# Patient Record
Sex: FEMALE | Race: WHITE | NOT HISPANIC OR LATINO | Employment: OTHER | URBAN - METROPOLITAN AREA
[De-identification: names, ages, dates, MRNs, and addresses within clinical notes are randomized per-mention and may not be internally consistent; named-entity substitution may affect disease eponyms.]

---

## 2022-01-12 ENCOUNTER — TELEPHONE (OUTPATIENT)
Dept: OBGYN CLINIC | Facility: CLINIC | Age: 37
End: 2022-01-12

## 2022-01-12 NOTE — TELEPHONE ENCOUNTER
Naye ervin she is 8 weeks pregnant  Could not get appt until she is about 12 weeks  She has been having dizziness and would like to know if she can get bloodwork check her iron level  Return call to naye ERVIN dizziness is not uncommon in pregnancy  Recommend increasing water intake to 64 ounces per day, use caution when changing positions  Prenatal labs will be ordered at time of first prenatal visit   Call back to discuss any additional symptoms or concerns she may be having for further review

## 2022-01-31 ENCOUNTER — VBI (OUTPATIENT)
Dept: ADMINISTRATIVE | Facility: OTHER | Age: 37
End: 2022-01-31

## 2022-02-01 ENCOUNTER — TELEPHONE (OUTPATIENT)
Dept: OBGYN CLINIC | Facility: CLINIC | Age: 37
End: 2022-02-01

## 2022-02-01 NOTE — TELEPHONE ENCOUNTER
Bronson Jenkins called emergency line stating she is 11 weeks and thinks she is miscarrying  She started with bleeding and mid pelvic cramping last evening  Increased cramping, bleeding and passing clots this morning  She is currently camping in [place] with family  Inquired if she has transportation to local emergency room  She reports her  can drive her  Requested she c/b after ER evaluation with update  Advised SOG will want to follow up with her  Patient verbalized agreement and understanding

## 2022-02-07 NOTE — TELEPHONE ENCOUNTER
Patient called stating that she was in [place] for a family trip last week  She states she went to the ER in a small town and they confirmed she was having a miscarriage and was giving a medication to open up her cervix and removed the POC  She was unsure to keep her appointment tomorrow with the doctor to follow-up or not  She states she does have the records from the ER and procedure  Advised her will keep her appointment with Dr Janie Nuñez to follow-up @ 3P tomorrow for her SAB and to make sure she bring those records with her to appointment  Pt voiced appreciation  1st PN appt cancelled with nurse and changed on Dr Ish Lofton schedule for [facility] Problem visit  Please contact our clinic if you have questions or if problems arise:    [place] office: [phone]  [facility] office: [phone]    If it is an urgent matter when the clinic is closed, please contact the [facility]  [phone] and ask to have Dr. Robbie Velez, or the ENT resident on-call paged. If it is a serious matter requiring immediate attention, please call 911 or go to your nearest emergency department.

## 2022-02-08 ENCOUNTER — ROUTINE PRENATAL (OUTPATIENT)
Dept: OBGYN CLINIC | Facility: CLINIC | Age: 37
End: 2022-02-08
Payer: COMMERCIAL

## 2022-02-08 VITALS
DIASTOLIC BLOOD PRESSURE: 60 MMHG | BODY MASS INDEX: 20.55 KG/M2 | SYSTOLIC BLOOD PRESSURE: 100 MMHG | HEIGHT: 63 IN | WEIGHT: 116 LBS

## 2022-02-08 DIAGNOSIS — O03.9 SAB (SPONTANEOUS ABORTION): Primary | ICD-10-CM

## 2022-02-08 PROCEDURE — 99214 OFFICE O/P EST MOD 30 MIN: CPT | Performed by: OBSTETRICS & GYNECOLOGY

## 2022-03-09 PROBLEM — O03.9 SAB (SPONTANEOUS ABORTION): Status: ACTIVE | Noted: 2022-02-08

## 2022-03-09 NOTE — PROGRESS NOTES
Assessment/Plan:    SAB (spontaneous )  Pt had SAB while in [place] last week  She was seen in the ER where an IUP without FHT was confirmed and she passed the gestational sac  Bleeding has stopped  She did receive Rhig per ER documentation  Exam and U/S today show a thin endometrial stripe and no gestational sac  There is no blood in the vault and the cervix is not dilated  Reviewed expectations regarding return of cycles and fertility       Diagnoses and all orders for this visit:    SAB (spontaneous )    Other orders  -     Prenatal Vit-Fe Fumarate-FA (PRENATAL VITAMINS PO); Take by mouth  -     Cyanocobalamin (Vitamin B 12) 100 MCG LOZG; Take by mouth          Subjective:      Patient ID: Suraj Madrid is a 39 y o  female      HPI    The following portions of the patient's history were reviewed and updated as appropriate: allergies, current medications, past family history, past medical history, past social history, past surgical history and problem list     Review of Systems      Objective:      /60 (BP Location: Right arm, Patient Position: Sitting, Cuff Size: Standard)   Ht 5' 3" (1 6 m)   Wt 52 6 kg (116 lb)   LMP 2021   BMI 20 55 kg/m²          Physical Exam

## 2022-03-09 NOTE — ASSESSMENT & PLAN NOTE
Pt had SAB while in [place] last week  She was seen in the ER where an IUP without FHT was confirmed and she passed the gestational sac  Bleeding has stopped  She did receive Rhig per ER documentation  Exam and U/S today show a thin endometrial stripe and no gestational sac  There is no blood in the vault and the cervix is not dilated   Reviewed expectations regarding return of cycles and fertility

## 2024-07-02 ENCOUNTER — ULTRASOUND (OUTPATIENT)
Dept: OBGYN CLINIC | Facility: CLINIC | Age: 39
End: 2024-07-02
Payer: COMMERCIAL

## 2024-07-02 VITALS
WEIGHT: 126 LBS | DIASTOLIC BLOOD PRESSURE: 58 MMHG | SYSTOLIC BLOOD PRESSURE: 110 MMHG | HEIGHT: 63 IN | BODY MASS INDEX: 22.32 KG/M2

## 2024-07-02 DIAGNOSIS — N91.2 AMENORRHEA: Primary | ICD-10-CM

## 2024-07-02 PROCEDURE — 76817 TRANSVAGINAL US OBSTETRIC: CPT | Performed by: OBSTETRICS & GYNECOLOGY

## 2024-07-02 PROCEDURE — 99213 OFFICE O/P EST LOW 20 MIN: CPT | Performed by: OBSTETRICS & GYNECOLOGY

## 2024-07-02 RX ORDER — VITAMIN B COMPLEX
1 CAPSULE ORAL DAILY
COMMUNITY

## 2024-07-02 RX ORDER — MULTIVIT WITH MINERALS/LUTEIN
1000 TABLET ORAL DAILY
COMMUNITY

## 2024-07-08 ENCOUNTER — PATIENT OUTREACH (OUTPATIENT)
Dept: OBGYN CLINIC | Facility: CLINIC | Age: 39
End: 2024-07-08

## 2024-07-08 ENCOUNTER — INITIAL PRENATAL (OUTPATIENT)
Dept: OBGYN CLINIC | Facility: CLINIC | Age: 39
End: 2024-07-08

## 2024-07-08 VITALS — HEIGHT: 63 IN | BODY MASS INDEX: 22.32 KG/M2

## 2024-07-08 DIAGNOSIS — Z3A.13 13 WEEKS GESTATION OF PREGNANCY: Primary | ICD-10-CM

## 2024-07-08 DIAGNOSIS — Z34.82 PRENATAL CARE, SUBSEQUENT PREGNANCY, SECOND TRIMESTER: ICD-10-CM

## 2024-07-08 DIAGNOSIS — Z31.430 ENCOUNTER OF FEMALE FOR TESTING FOR GENETIC DISEASE CARRIER STATUS FOR PROCREATIVE MANAGEMENT: ICD-10-CM

## 2024-07-08 PROCEDURE — OBC: Performed by: NURSE PRACTITIONER

## 2024-07-08 NOTE — PATIENT INSTRUCTIONS
Congratulation!! Please review our Pregnancy Essential Guide and [facility] L&D Virtual tour from our networks website.     [facility] Pregnancy Essentials Guide  [facility] ([email or web address])     [facility] - Virtual Tour (Coolstuff)  Federico Duran,     It was so nice getting to know you today. Remember if you have an urgent or time sensitive concern, please call the practice phone number so a clinical triage team member can review your symptoms and get in touch with our on call provider if necessary. If you have general questions or need help navigating our services please REPLY to this message so it comes directly to me and I will respond between other patients. If I am out of the office for any reason, another nurse navigator may reach out to help you.     Again, Congratulations and Thank You for choosing [facility]. I look forward to helping you through this journey.   Glenis HERNDON RN

## 2024-07-08 NOTE — PROGRESS NOTES
"Routine Prenatal Visit  [facility] - [address], [address], [place] [address]      Assessment/Plan:  39 y.o.  presenting with missed menses.  Viable pregnancy 12w1d by LMP consistent with ultrasound today.  - Continue/start prenatal vitamin  - We reviewed her current medications and discussed which are safe to continue in pregnancy  - We briefly discussed options for aneuploidy screening, to be discussed further at the prenatal intake  - Schedule prenatal intake with RN and initial prenatal visit; prenatal labs will be ordered during the prenatal intake      Subjective:    CC: Missed period    Naye Duran is a 39 y.o.  who presents with missed menses.  Patient's last menstrual period was 2024 (exact date).    Patient notes that this pregnancy was planned and desired.  She was not using contraception at the time of conception. She reports she is certain of her LMP and that she has regular menses. She has has no vaginal bleeding since her LMP.    Objective:  /58 (BP Location: Right arm, Patient Position: Sitting, Cuff Size: Standard)   Ht 5' 3\" (1.6 m)   Wt 57.2 kg (126 lb)   LMP 2024 (Exact Date)   BMI 22.32 kg/m²     Physical Exam:  General: Well appearing, no distress  CV: Regular rate  Respiratory: Unlabored breathing  Abdomen: Soft, nontender  Extremities: Without edema  Mood and Affect: Appropriate    Transvaginal Pelvic Ultrasound  Guo IUP  Yolk sac: Present  Fetal Pole: Present  CRL consistent with EGA 12w5d  Cardiac activity: Present   bpm  No adnexal masses appreciated  LMP used for dating as the CRL is 4 days different from LMP over 12 weeks gestation.     "

## 2024-07-08 NOTE — PROGRESS NOTES
SW referred for initial prenatal assessment. Patient is , [id number] with an MARLIN of 24. Patient agreeable to talking with SW today by phone.    Patient reports this is an unplanned but welcomed pregnancy. She and FOB ( Keith) both drive. Patient is a SAHM, FOB works. Patient denies needing information for MA/WIC/SNAP information, parenting education, or baby supply resources today.     Patient denies current or h/o mental health, substance use, DV/IPV, CYS involvement, and legal concerns. She indicates good support from FOB, family, and friends. She enjoys reading relaxing, and being outside for stress relief.     No SW needs identified at this time, SW closing referral. Please re-refer as needed.

## 2024-07-08 NOTE — PROGRESS NOTES
OB INTAKE INTERVIEW  Patient is 39 y.o. who presents for OB intake at 13 wks  She is accompanied by herself during this encounter - THIS IS A TELEPHONE ENCOUNTER  The father of her baby (Keith Duran) is involved in the pregnancy and is 40 years old.      Last Menstrual Period: 2024  Ultrasound: Measured 12 weeks 5 days on 2024  Estimated Date of Delivery: 2025 confirmed by US    Signs/Symptoms of Pregnancy  Current pregnancy symptoms: nausea, not presently, sl urinary frequency  Constipation no  Headaches YES (not migraine-type), sometimes takes Magnesium for migraine and/or sleep  Cramping/spotting no  PICA cravings no    Diabetes-  There is no height or weight on file to calculate BMI.  If patient has 1 or more, please order early 1 hour GTT  History of GDM no  BMI >35 no  History of PCOS or current metformin use no  History of LGA/macrosomic infant (4000g/9lbs) no    If patient has 2 or more, please order early 1 hour GTT  BMI>30 no  AMA YES  First degree relative with type 2 diabetes YES  History of chronic HTN, hyperlipidemia, elevated A1C no  High risk race (, , ,  or ) no    Hypertension- if you answer yes to any of the following, please order baseline preeclampsia labs (cbc, comprehensive metabolic panel, urine protein creatinine ratio, uric acid)  History of of chronic HTN no  History of gestational HTN no  History of preeclampsia, eclampsia, or HELLP syndrome no  History of diabetes no  History of lupus, autoimmune disease, kidney disease no    Thyroid- if yes order TSH with reflex T4  History of thyroid disease no    Bleeding Disorder or Hx of DVT-patient or first degree relative with history of. Order the following if not done previously.   (Factor V, antithrombin III, prothrombin gene mutation, protein C and S Ag, lupus anticoagulant, anticardiolipin, beta-2 glycoprotein)   no    OB/GYN-  History of abnormal pap smear no        Date of last pap smear 2028  History of HPV no  History of Herpes/HSV no  History of other STI (gonorrhea, chlamydia, trich) no  History of prior  no  History of prior  YES x 2  History of  delivery prior to 36 weeks 6 days no  History of blood transfusion no  Ok for blood transfusion YES    Substance screening  History of tobacco use YES (quit )  Currently using tobacco no  Substance Use Screen Level (N/A, LOW, HIGH) N/A    MRSA Screening-   Does the pt have a hx of MRSA? no    Immunizations:  Influenza vaccine given this season NO - recommended in pregnancy  Discussed Tdap vaccine YES - patient declines  Discussed COVID Vaccine YES - no previous Covid vaccines, no hx Covid    Genetic/MFM-  Do you or your partner have a history of any of the following in yourselves or first degree relatives?  Cystic fibrosis no  Spinal muscular atrophy no  Hemoglobinopathy/Sickle Cell/Thalassemia no  Fragile X Intellectual Disability no    If yes, discuss Carrier Screening and recommend consultation with MFM/Genetic Counseling and place specific [facility] Referral for.    If no, discuss Carrier Screening being completed once in a lifetime as a standard of care lab test. Place orders for Cystic Fibrosis Gene Test (SMK971) and Spinal Muscular Atrophy DNA ([id number])      Appointment for Nuchal Translucency Ultrasound at [facility] scheduled for - patient was not seen for D&V US until 12 wk 1 day - patient will call to schedule NT & discussion re: NIPT (MFM referral placed in patient's chart)      Interview education  [facility] Pregnancy Essentials Book reviewed, discussed and attached to their AVS YES  Nurse/Family Partnership- patient may qualify referral placed NO    Prenatal lab work scripts YES  ([facility])  Extra labs ordered:  CF/SMA (patient will verify insurance coverage & decide)    Aspirin/Preeclampsia Screen    Risk Level Risk Factor Recommendation   LOW Prior Uncomplicated full-term delivery no No Aspirin  recommendation        MODERATE Nulliparity NO Recommend low-dose aspirin if     BMI>30 NO 2 or more moderate risk factors    Family History Preeclampsia (mother/sister) YES (with 1st preg with patient)     35yr old or greater YES     Black Race, Concern for SDOH/Low Socioeconomic no     IVF Pregnancy  no     Personal History Risks (low birth weight, prior adverse preg outcome, >10yr preg interval) no         HIGH History of Preeclampsia no Recommend low-dose aspirin if     Multifetal gestation no 1 or more high risk factors    Chronic HTN no     Type 1 or 2 Diabetes no     Renal Disease no     Autoimmune Disease  no      Contraindications to ASA therapy:  NSAID/ ASA allergy: no  Nasal polyps: no  Asthma with history of ASA induced bronchospasm: no  Relative contraindications:  History of GI bleed: no  Active peptic ulcer disease: no  Severe hepatic dysfunction: no    Patient should be recommended to take ASA 162mg during this pregnancy from 12-36wks to lower her risk of preeclampsia: MODERATE RISK per screening protocol - LDASA 162 mg/day recommended to take 12-36 wk - patient will decide if plans to take    The patient has a history now or in prior pregnancy notable for:  - hx C/S x 2 (2014, 2015) - thru this practice at [place] - plans repeat C/S - may consider tubal - will sign records release @ appointment 7/9/2024  - hx SAB 2/2022 (in [place] - had follow up appointment here)  - patient's blood type/Rh is O NEGATIVE - Rhogam candidate      Details that I feel the provider should be aware of: see above    - patient follows high protein, low carb diet - no dietary restrictions - reviewed dietary recommendations in pregnancy  - taking several dietary supplements - will start prenatal vitamin w/ dha  - patient interested in monitoring blood sugars x 7-10 days instead of having 1 hr glucola or FBS/2 hr post-prandial (her  & 8 yr old daughter Type1 DM    - patient is up to date for dental cleanings -  recommended q 6 months        PN1 visit scheduled. The patient was oriented to our practice, the navigator role, reviewed delivering physicians and [facility] for Delivery. All questions were answered.    Interviewed by: SHIRA Greer RN

## 2024-07-09 ENCOUNTER — INITIAL PRENATAL (OUTPATIENT)
Dept: OBGYN CLINIC | Facility: CLINIC | Age: 39
End: 2024-07-09
Payer: COMMERCIAL

## 2024-07-09 VITALS
SYSTOLIC BLOOD PRESSURE: 110 MMHG | WEIGHT: 126.8 LBS | DIASTOLIC BLOOD PRESSURE: 50 MMHG | BODY MASS INDEX: 22.47 KG/M2 | HEIGHT: 63 IN

## 2024-07-09 DIAGNOSIS — Z3A.13 13 WEEKS GESTATION OF PREGNANCY: ICD-10-CM

## 2024-07-09 DIAGNOSIS — O09.521 MULTIGRAVIDA OF ADVANCED MATERNAL AGE IN FIRST TRIMESTER: ICD-10-CM

## 2024-07-09 DIAGNOSIS — O34.211 MATERNAL CARE DUE TO LOW TRANSVERSE UTERINE SCAR FROM PREVIOUS CESAREAN DELIVERY: Primary | ICD-10-CM

## 2024-07-09 DIAGNOSIS — Z12.4 SCREENING FOR MALIGNANT NEOPLASM OF THE CERVIX: ICD-10-CM

## 2024-07-09 LAB
EXTERNAL CHLAMYDIA SCREEN: NEGATIVE
EXTERNAL GONORRHEA SCREEN: NEGATIVE
SL AMB  POCT GLUCOSE, UA: NORMAL
SL AMB POCT URINE PROTEIN: NORMAL

## 2024-07-09 PROCEDURE — 81002 URINALYSIS NONAUTO W/O SCOPE: CPT | Performed by: OBSTETRICS & GYNECOLOGY

## 2024-07-09 PROCEDURE — PNV: Performed by: OBSTETRICS & GYNECOLOGY

## 2024-07-12 ENCOUNTER — ROUTINE PRENATAL (OUTPATIENT)
Dept: PERINATAL CARE | Facility: OTHER | Age: 39
End: 2024-07-12
Payer: COMMERCIAL

## 2024-07-12 VITALS
SYSTOLIC BLOOD PRESSURE: 104 MMHG | WEIGHT: 127 LBS | HEART RATE: 94 BPM | HEIGHT: 63 IN | BODY MASS INDEX: 22.5 KG/M2 | DIASTOLIC BLOOD PRESSURE: 54 MMHG

## 2024-07-12 DIAGNOSIS — Z33.1 PREGNANT STATE, INCIDENTAL: ICD-10-CM

## 2024-07-12 DIAGNOSIS — Z36.82 ENCOUNTER FOR NUCHAL TRANSLUCENCY TESTING: ICD-10-CM

## 2024-07-12 DIAGNOSIS — Z3A.13 13 WEEKS GESTATION OF PREGNANCY: ICD-10-CM

## 2024-07-12 DIAGNOSIS — O09.521 MULTIGRAVIDA OF ADVANCED MATERNAL AGE IN FIRST TRIMESTER: Primary | ICD-10-CM

## 2024-07-12 DIAGNOSIS — Z36.9 UNSPECIFIED ANTENATAL SCREENING: ICD-10-CM

## 2024-07-12 DIAGNOSIS — O34.211 MATERNAL CARE DUE TO LOW TRANSVERSE UTERINE SCAR FROM PREVIOUS CESAREAN DELIVERY: ICD-10-CM

## 2024-07-12 LAB
C TRACH RRNA CVX QL NAA+PROBE: NEGATIVE
CYTOLOGIST CVX/VAG CYTO: NORMAL
DX ICD CODE: NORMAL
HPV GENOTYPE REFLEX: NORMAL
HPV I/H RISK 4 DNA CVX QL PROBE+SIG AMP: NEGATIVE
N GONORRHOEA RRNA CVX QL NAA+PROBE: NEGATIVE
OTHER STN SPEC: NORMAL
PATH REPORT.FINAL DX SPEC: NORMAL
SL AMB NOTE:: NORMAL
SL AMB SPECIMEN ADEQUACY: NORMAL
SL AMB TEST METHODOLOGY: NORMAL

## 2024-07-12 PROCEDURE — 76813 OB US NUCHAL MEAS 1 GEST: CPT | Performed by: OBSTETRICS & GYNECOLOGY

## 2024-07-12 PROCEDURE — 76801 OB US < 14 WKS SINGLE FETUS: CPT | Performed by: OBSTETRICS & GYNECOLOGY

## 2024-07-12 PROCEDURE — 99203 OFFICE O/P NEW LOW 30 MIN: CPT | Performed by: OBSTETRICS & GYNECOLOGY

## 2024-07-12 PROCEDURE — 36415 COLL VENOUS BLD VENIPUNCTURE: CPT | Performed by: OBSTETRICS & GYNECOLOGY

## 2024-07-12 NOTE — PROGRESS NOTES
Patient chose to have [facility] [id number] Non-Invasive Prenatal Screen [id number] PLUS w/ SCA, WITH fetal sex (per pt request).  Patient choose to be billed through insurance.     Patient given brochure and is aware [facility] will contact patient's insurance and coordinate coverage.  Provided [facility] contact information. General inquiries [phone], Cost estimates [phone] and [facility] Billing [phone]. Website [email or web address].     Blood collection tubes labeled with patient identifiers (name, medical record number, and date of birth).     Filled out [facility] order form. Patient chose to have blood drawn in our office at time of visit. NIPS was drawn from right arm with a butterfly needle by A Zak LOPEZ. .      If patient chose to have blood work drawn at a [facility] lab we requested patient notify MFM (via phone call or Toutpost message) when blood collected so office can follow up on results.       Maternal Fetal Medicine will have results in approximately 5-7 business days and will call patient or notify via Toutpost.  Patient aware viewing lab result online will reveal fetal sex if ordered.    Patient verbalized understanding of all instructions and no questions at this time.

## 2024-07-12 NOTE — PROGRESS NOTES
"Naye presents today for a genetic screening ultrasound.  This is her fourth pregnancy.  She has a history of 2 previous full-term  sections and 1 first trimester miscarriage.  She has no significant medical history otherwise.  She has no significant substance use history.  Family history is significant for diabetes and hypertension in her mother.  Naye had no issues with hypertension in her prior pregnancies.  A review of systems is otherwise negative.    We discussed the options for genetic screening, including but not limited to first trimester screening, second trimester screening, combined first and second trimester screening, noninvasive prenatal screening (NIPS) for patients at high risk and diagnostic screening through the use of CVS and amniocentesis. We discussed the risks and benefits of each approach including the sensitivities and false positive rates as well as the difference between a screening test and a diagnostic test. At the conclusion of our discussion the patient elected noninvasive prenatal screening utilizing the MaterniT 21 plus test. The patient had this bloowork drawn in the office.   The results should be available in approximately 7-10 days.    We discussed follow-up in detail and I recommend an anatomy ultrasound be scheduled for 20 weeks gestation.    Thank you very much for allowing us to participate in the care of this very nice patient. Should you have any questions, please do not hesitate to contact me.    Portions of the record may have been created with voice recognition software. Occasional wrong word or \"sound a like\" substitutions may have occurred due to the inherent limitations of voice recognition software. Read the chart carefully and recognize, using context, where substitutions have occurred.  "

## 2024-07-18 NOTE — PROGRESS NOTES
"Routine Prenatal Visit  [facility] - [address], [address], [place] [address]    Assessment/Plan:  Naye is a 39 y.o. year old  at 13w1d who presents for routine prenatal visit.     1. Maternal care due to low transverse uterine scar from previous  delivery  2. Multigravida of advanced maternal age in first trimester  3. 13 weeks gestation of pregnancy  -     POCT urine dip  4. Screening for malignant neoplasm of the cervix  -     IGP,CtNg,AptimaHPV,rfx16/18,45        Subjective:     CC: Prenatal care    Naye Duran is a 39 y.o.  female who presents for routine prenatal care at 13w1d.  Pregnancy ROS: no leakage of fluid, pelvic pain, or vaginal bleeding.  no fetal movement.    The following portions of the patient's history were reviewed and updated as appropriate: allergies, current medications, past family history, past medical history, obstetric history, gynecologic history, past social history, past surgical history and problem list.      Objective:  /50 (BP Location: Left arm, Patient Position: Sitting, Cuff Size: Standard)   Ht 5' 3\" (1.6 m)   Wt 57.5 kg (126 lb 12.8 oz)   LMP 2024 (Exact Date)   BMI 22.46 kg/m²   Pregravid Weight/BMI: 55.8 kg (123 lb) (BMI 21.79)  Current Weight: 57.5 kg (126 lb 12.8 oz)   Total Weight Gain: 1.724 kg (3 lb 12.8 oz)   Pre-[place] Vitals      Flowsheet Row Most Recent Value   Prenatal Assessment    Fetal Heart Rate 155   Fundal Height (cm) 14 cm   Prenatal Vitals    Blood Pressure 110/50   Weight - Scale 57.5 kg (126 lb 12.8 oz)   Urine Albumin/Glucose    Dilation/Effacement/Station    Cervical Dilation 0   Vaginal Drainage    Edema              General: Well appearing, no distress  Respiratory: Unlabored breathing  Cardiovascular: Regular rate.  Abdomen: Soft, gravid, nontender  Fundal Height: Appropriate for gestational age.  Extremities: Warm and well perfused.  Non tender.  "

## 2024-07-19 LAB
CFDNA.FET/CFDNA.TOTAL SFR FETUS: NORMAL %
CITATION REF LAB TEST: NORMAL
FET 13+18+21+X+Y ANEUP PLAS.CFDNA: NEGATIVE
FET CHR 21 TS PLAS.CFDNA QL: NEGATIVE
FET CHR 21 TS PLAS.CFDNA QL: NEGATIVE
FET MS X RISK WBC.DNA+CFDNA QL: NOT DETECTED
FET SEX PLAS.CFDNA DOSAGE CFDNA: NORMAL
FET TS 13 RISK PLAS.CFDNA QL: NEGATIVE
FET X + Y ANEUP RISK PLAS.CFDNA SEQ-IMP: NOT DETECTED
GA EST FROM CONCEPTION DATE: NORMAL D
GESTATIONAL AGE > 9:: YES
LAB DIRECTOR NAME PROVIDER: NORMAL
LAB DIRECTOR NAME PROVIDER: NORMAL
LABORATORY COMMENT REPORT: NORMAL
LIMITATIONS OF THE TEST: NORMAL
NEGATIVE PREDICTIVE VALUE: NORMAL
PERFORMANCE CHARACTERISTICS: NORMAL
POSITIVE PREDICTIVE VALUE: NORMAL
REF LAB TEST METHOD: NORMAL
SL AMB NOTE:: NORMAL
TEST PERFORMANCE INFO SPEC: NORMAL

## 2024-07-19 NOTE — RESULT ENCOUNTER NOTE
I have reviewed the results of the NIPS which are low risk.  Please call patient and notify her of these reassuring results if she has not viewed on MyChart. Please ensure she is notified of recommendation of MSAFP to be ordered and followed up through her primary Obstetrician's office.      Thank you, Evangelist Daily MD

## 2024-07-24 LAB
EXTERNAL ABO GROUPING: NORMAL
EXTERNAL ANTIBODY SCREEN: NORMAL
EXTERNAL HEMATOCRIT: 37 %
EXTERNAL HEMOGLOBIN: 12.3 G/DL
EXTERNAL HEPATITIS B SURFACE ANTIGEN: NEGATIVE
EXTERNAL HIV-1/2 AB-AG: NORMAL
EXTERNAL PLATELET COUNT: 325 K/ÂΜL
EXTERNAL RH FACTOR: NEGATIVE
EXTERNAL RUBELLA IGG QUANTITATION: NORMAL
EXTERNAL SYPHILIS TOTAL IGG/IGM SCREENING: NONREACTIVE

## 2024-07-27 LAB
ABO GROUP BLD: ABNORMAL
APPEARANCE UR: CLEAR
BACTERIA UR CULT: ABNORMAL
BACTERIA UR CULT: ABNORMAL
BACTERIA URNS QL MICRO: ABNORMAL
BASOPHILS # BLD AUTO: 0 X10E3/UL (ref 0–0.2)
BASOPHILS NFR BLD AUTO: 0 %
BILIRUB UR QL STRIP: NEGATIVE
BLD GP AB SCN SERPL QL: NEGATIVE
C TRACH RRNA SPEC QL NAA+PROBE: NEGATIVE
CASTS URNS QL MICRO: ABNORMAL /LPF
COLOR UR: YELLOW
EOSINOPHIL # BLD AUTO: 0.1 X10E3/UL (ref 0–0.4)
EOSINOPHIL NFR BLD AUTO: 1 %
EPI CELLS #/AREA URNS HPF: >10 /HPF (ref 0–10)
ERYTHROCYTE [DISTWIDTH] IN BLOOD BY AUTOMATED COUNT: 12.8 % (ref 11.7–15.4)
GLUCOSE UR QL: NEGATIVE
HBV SURFACE AG SERPL QL IA: NEGATIVE
HCT VFR BLD AUTO: 37 % (ref 34–46.6)
HCV AB S/CO SERPL IA: NON REACTIVE
HGB BLD-MCNC: 12.3 G/DL (ref 11.1–15.9)
HGB UR QL STRIP: NEGATIVE
HIV 1+2 AB+HIV1 P24 AG SERPL QL IA: NON REACTIVE
IMM GRANULOCYTES # BLD: 0.1 X10E3/UL (ref 0–0.1)
IMM GRANULOCYTES NFR BLD: 1 %
KETONES UR QL STRIP: NEGATIVE
LEUKOCYTE ESTERASE UR QL STRIP: ABNORMAL
LYMPHOCYTES # BLD AUTO: 1.4 X10E3/UL (ref 0.7–3.1)
LYMPHOCYTES NFR BLD AUTO: 15 %
MCH RBC QN AUTO: 30.2 PG (ref 26.6–33)
MCHC RBC AUTO-ENTMCNC: 33.2 G/DL (ref 31.5–35.7)
MCV RBC AUTO: 91 FL (ref 79–97)
MICRO URNS: ABNORMAL
MONOCYTES # BLD AUTO: 0.6 X10E3/UL (ref 0.1–0.9)
MONOCYTES NFR BLD AUTO: 6 %
N GONORRHOEA RRNA SPEC QL NAA+PROBE: NEGATIVE
NEUTROPHILS # BLD AUTO: 6.9 X10E3/UL (ref 1.4–7)
NEUTROPHILS NFR BLD AUTO: 77 %
NITRITE UR QL STRIP: POSITIVE
OTHER ANTIBIOTIC SUSC ISLT: ABNORMAL
PH UR STRIP: 7 [PH] (ref 5–7.5)
PLATELET # BLD AUTO: 325 X10E3/UL (ref 150–450)
PROT UR QL STRIP: NEGATIVE
RBC # BLD AUTO: 4.07 X10E6/UL (ref 3.77–5.28)
RBC #/AREA URNS HPF: ABNORMAL /HPF (ref 0–2)
RH BLD: NEGATIVE
RPR SER QL: NON REACTIVE
RUBV IGG SERPL IA-ACNC: 1.91 INDEX
SL AMB INTERPRETATION: NORMAL
SP GR UR: 1.02 (ref 1–1.03)
UROBILINOGEN UR STRIP-ACNC: 0.2 MG/DL (ref 0.2–1)
WBC # BLD AUTO: 9 X10E3/UL (ref 3.4–10.8)
WBC #/AREA URNS HPF: >30 /HPF (ref 0–5)

## 2024-08-06 ENCOUNTER — ROUTINE PRENATAL (OUTPATIENT)
Dept: OBGYN CLINIC | Facility: CLINIC | Age: 39
End: 2024-08-06
Payer: COMMERCIAL

## 2024-08-06 VITALS
DIASTOLIC BLOOD PRESSURE: 52 MMHG | HEIGHT: 63 IN | BODY MASS INDEX: 23.46 KG/M2 | WEIGHT: 132.4 LBS | SYSTOLIC BLOOD PRESSURE: 100 MMHG

## 2024-08-06 DIAGNOSIS — Z67.91 RH NEGATIVE STATE IN ANTEPARTUM PERIOD: ICD-10-CM

## 2024-08-06 DIAGNOSIS — Z36.1 NEED FOR MATERNAL SERUM ALPHA-PROTEIN (MSAFP) SCREENING: ICD-10-CM

## 2024-08-06 DIAGNOSIS — O34.211 MATERNAL CARE DUE TO LOW TRANSVERSE UTERINE SCAR FROM PREVIOUS CESAREAN DELIVERY: ICD-10-CM

## 2024-08-06 DIAGNOSIS — Z3A.17 17 WEEKS GESTATION OF PREGNANCY: ICD-10-CM

## 2024-08-06 DIAGNOSIS — O09.521 MULTIGRAVIDA OF ADVANCED MATERNAL AGE IN FIRST TRIMESTER: ICD-10-CM

## 2024-08-06 DIAGNOSIS — R82.71 BACTERIURIA DURING PREGNANCY: Primary | ICD-10-CM

## 2024-08-06 DIAGNOSIS — O99.891 BACTERIURIA DURING PREGNANCY: Primary | ICD-10-CM

## 2024-08-06 DIAGNOSIS — O26.899 RH NEGATIVE STATE IN ANTEPARTUM PERIOD: ICD-10-CM

## 2024-08-06 LAB
SL AMB  POCT GLUCOSE, UA: NORMAL
SL AMB POCT URINE PROTEIN: NORMAL

## 2024-08-06 PROCEDURE — 81002 URINALYSIS NONAUTO W/O SCOPE: CPT | Performed by: PHYSICIAN ASSISTANT

## 2024-08-06 PROCEDURE — PNV: Performed by: PHYSICIAN ASSISTANT

## 2024-08-06 NOTE — ASSESSMENT & PLAN NOTE
50,000-100,000 CFU of E. Coli in initial prenatal labs.   Reviewed with patient and recommended starting antibiotics. Patient is asymptomatic and reports that they did not have her do a clean catch at the lab. Would like to repeat clean catch urine prior to starting antibiotics. Aware to call if symptoms develop before clean catch results are in. Aware of s/s to got to ER with.

## 2024-08-06 NOTE — PROGRESS NOTES
Routine Prenatal Visit  [facility] - [address], [address], [place] [address]    Assessment/Plan:  Naye is a 39 y.o. year old  at 17w1d who presents for routine prenatal visit.     1. Bacteriuria during pregnancy  Assessment & Plan:  50,000-100,000 CFU of E. Coli in initial prenatal labs.   Reviewed with patient and recommended starting antibiotics. Patient is asymptomatic and reports that they did not have her do a clean catch at the lab. Would like to repeat clean catch urine prior to starting antibiotics. Aware to call if symptoms develop before clean catch results are in. Aware of s/s to got to ER with.   Orders:  -     Urine culture; Future  -     Urinalysis with microscopic; Future  -     Urine culture  -     Urinalysis with microscopic  2. 17 weeks gestation of pregnancy  Assessment & Plan:  Reviewed initial prenatal labs. Results console updated.   Reviewed AFP to evaluate for ONTD. Script given. Aware to have done between 15-18 weeks. Aware to check insurance for coverage prior to having one.   Has level II ultrasound scheduled.   Return to office for ob check in 4 weeks.   Orders:  -     POCT urine dip  3. Multigravida of advanced maternal age in first trimester  4. Maternal care due to low transverse uterine scar from previous  delivery  5. Rh negative state in antepartum period  6. Need for maternal serum alpha-protein (MSAFP) screening  -     Alpha fetoprotein, maternal; Future  -     Alpha fetoprotein, maternal      Next OB Visit 4 weeks.    Subjective:     CC: Prenatal care    Naye Duran is a 39 y.o.  female who presents for routine prenatal care at 17w1d.  Pregnancy ROS: Headaches last week, better this week. No FM, N/V, cramping, VB, LOF, edema, domestic violence, or smoking. Tolerating PNV.        The following portions of the patient's history were reviewed and updated as appropriate: allergies, current medications, past family history,  "past medical history, obstetric history, gynecologic history, past social history, past surgical history and problem list.      Objective:  /52   Ht 5' 3\" (1.6 m)   Wt 60.1 kg (132 lb 6.4 oz)   LMP 2024 (Exact Date)   BMI 23.45 kg/m²   Pregravid Weight/BMI: 55.8 kg (123 lb) (BMI 21.79)  Current Weight: 60.1 kg (132 lb 6.4 oz)   Total Weight Gain: 4.264 kg (9 lb 6.4 oz)   Pre- Vitals      Flowsheet Row Most Recent Value   Prenatal Assessment    Fetal Heart Rate 150   Fundal Height (cm) 17 cm   Prenatal Vitals    Blood Pressure 100/52   Weight - Scale 60.1 kg (132 lb 6.4 oz)   Urine Albumin/Glucose    Dilation/Effacement/Station    Vaginal Drainage    Edema    LLE Edema None   RLE Edema None   Facial Edema None             General: Well appearing, no distress  Abdomen: Soft, gravid, nontender  Fundal Height: Appropriate for gestational age.  Extremities: Warm and well perfused.  Non tender.      "

## 2024-08-06 NOTE — ASSESSMENT & PLAN NOTE
Reviewed initial prenatal labs. Results console updated.   Reviewed AFP to evaluate for ONTD. Script given. Aware to have done between 15-18 weeks. Aware to check insurance for coverage prior to having one.   Has level II ultrasound scheduled.   Return to office for ob check in 4 weeks.

## 2024-08-15 ENCOUNTER — TELEPHONE (OUTPATIENT)
Dept: OBGYN CLINIC | Facility: CLINIC | Age: 39
End: 2024-08-15

## 2024-08-15 LAB
2ND TRIMESTER 4 SCREEN SERPL-IMP: NORMAL
AFP ADJ MOM SERPL: 1.15
AFP INTERP AMN-IMP: NORMAL
AFP INTERP SERPL-IMP: NORMAL
AFP INTERP SERPL-IMP: NORMAL
AFP SERPL-MCNC: 56.1 NG/ML
AGE AT DELIVERY: 39.5 YR
GA METHOD: NORMAL
GA: 18.1 WEEKS
IDDM PATIENT QL: NO
MULTIPLE PREGNANCY: NO
NEURAL TUBE DEFECT RISK FETUS: 7603 %

## 2024-08-15 NOTE — TELEPHONE ENCOUNTER
Second trimester call-voicemail received, left a message to call back.   Overall how are you doing?     Compliant with routine OB care appointments? yes    Have you completed your 1st trimester labs? yes    If you had NIPS with MFM, do you have a order for MSAFP?   Completed 8/13/24   Can be completed 15w-22w9d, ideally 16w-18w    Have you seen MFM and do you have your detailed US scheduled? 9/9/24    Pregnancy Education-have you had a chance to review the classes offered and registered?     EPDS Score

## 2024-08-17 LAB
APPEARANCE UR: CLEAR
BACTERIA UR CULT: ABNORMAL
BACTERIA URNS QL MICRO: NORMAL
BILIRUB UR QL STRIP: NEGATIVE
CASTS URNS QL MICRO: NORMAL /LPF
COLOR UR: YELLOW
EPI CELLS #/AREA URNS HPF: NORMAL /HPF (ref 0–10)
GLUCOSE UR QL: NEGATIVE
HGB UR QL STRIP: NEGATIVE
KETONES UR QL STRIP: NEGATIVE
LEUKOCYTE ESTERASE UR QL STRIP: NEGATIVE
Lab: ABNORMAL
MICRO URNS: NORMAL
MICRO URNS: NORMAL
NITRITE UR QL STRIP: NEGATIVE
PH UR STRIP: 6.5 [PH] (ref 5–7.5)
PROT UR QL STRIP: NEGATIVE
RBC #/AREA URNS HPF: NORMAL /HPF (ref 0–2)
SL AMB ANTIMICROBIAL SUSCEPTIBILITY: ABNORMAL
SP GR UR: 1.01 (ref 1–1.03)
UROBILINOGEN UR STRIP-ACNC: 0.2 MG/DL (ref 0.2–1)
WBC #/AREA URNS HPF: NORMAL /HPF (ref 0–5)

## 2024-08-19 DIAGNOSIS — O99.891 BACTERIURIA DURING PREGNANCY: ICD-10-CM

## 2024-08-19 DIAGNOSIS — R82.71 BACTERIURIA DURING PREGNANCY: ICD-10-CM

## 2024-08-19 DIAGNOSIS — O23.42 URINARY TRACT INFECTION IN PREGNANCY IN SECOND TRIMESTER, ANTEPARTUM: Primary | ICD-10-CM

## 2024-08-19 RX ORDER — NITROFURANTOIN 25; 75 MG/1; MG/1
100 CAPSULE ORAL 2 TIMES DAILY
Qty: 14 CAPSULE | Refills: 0 | Status: SHIPPED | OUTPATIENT
Start: 2024-08-19 | End: 2024-08-26

## 2024-08-23 NOTE — TELEPHONE ENCOUNTER
Second Attempt-Second trimester call-voicemail received, left a message to call back.   Overall how are you doing?      Compliant with routine OB care appointments? yes     Have you completed your 1st trimester labs? yes     If you had NIPS with MFM, do you have a order for MSAFP?   Completed 8/13/24              Can be completed 15w-22w9d, ideally 16w-18w     Have you seen MFM and do you have your detailed US scheduled? 9/9/24     Pregnancy Education-have you had a chance to review the classes offered and registered?      EPDS Score

## 2024-09-04 ENCOUNTER — ROUTINE PRENATAL (OUTPATIENT)
Dept: OBGYN CLINIC | Facility: CLINIC | Age: 39
End: 2024-09-04
Payer: COMMERCIAL

## 2024-09-04 VITALS
DIASTOLIC BLOOD PRESSURE: 64 MMHG | HEIGHT: 63 IN | BODY MASS INDEX: 23.57 KG/M2 | SYSTOLIC BLOOD PRESSURE: 110 MMHG | WEIGHT: 133 LBS

## 2024-09-04 DIAGNOSIS — O09.521 MULTIGRAVIDA OF ADVANCED MATERNAL AGE IN FIRST TRIMESTER: ICD-10-CM

## 2024-09-04 DIAGNOSIS — Z3A.21 21 WEEKS GESTATION OF PREGNANCY: Primary | ICD-10-CM

## 2024-09-04 DIAGNOSIS — O34.211 MATERNAL CARE DUE TO LOW TRANSVERSE UTERINE SCAR FROM PREVIOUS CESAREAN DELIVERY: ICD-10-CM

## 2024-09-04 DIAGNOSIS — Z30.2 REQUEST FOR STERILIZATION: ICD-10-CM

## 2024-09-04 LAB
SL AMB  POCT GLUCOSE, UA: NORMAL
SL AMB POCT URINE PROTEIN: NORMAL

## 2024-09-04 PROCEDURE — PNV: Performed by: OBSTETRICS & GYNECOLOGY

## 2024-09-04 PROCEDURE — 81002 URINALYSIS NONAUTO W/O SCOPE: CPT | Performed by: OBSTETRICS & GYNECOLOGY

## 2024-09-04 NOTE — PROGRESS NOTES
"Routine Prenatal Visit  [facility] - [place]  [address], [address], [place] [address]    Assessment/Plan:  Naye is a 39 y.o. year old  at 21w2d who presents for routine prenatal visit.     1. 21 weeks gestation of pregnancy  -     POCT urine dip  2. Multigravida of advanced maternal age in first trimester  3. Maternal care due to low transverse uterine scar from previous  delivery  4. Request for sterilization  Comments:  Desires bilateral salpingectomy at time of repeat C/S  Assessment & Plan:  Desires bilateral salpingectomy at time of repeat C/S      Next OB Visit 4 weeks.    Subjective:     CC: Prenatal care    Naye Duran is a 39 y.o.  female who presents for routine prenatal care at 21w2d.  Pregnancy ROS: no leakage of fluid, pelvic pain, or vaginal bleeding.  normal fetal movement.    The following portions of the patient's history were reviewed and updated as appropriate: allergies, current medications, past family history, past medical history, obstetric history, gynecologic history, past social history, past surgical history and problem list.      Objective:  /64 (BP Location: Right arm, Patient Position: Sitting, Cuff Size: Standard)   Ht 5' 3\" (1.6 m)   Wt 60.3 kg (133 lb)   LMP 2024 (Exact Date)   BMI 23.56 kg/m²   Pregravid Weight/BMI: 55.8 kg (123 lb) (BMI 21.79)  Current Weight: 60.3 kg (133 lb)   Total Weight Gain: 4.536 kg (10 lb)   Pre- Vitals      Flowsheet Row Most Recent Value   Prenatal Assessment    Fetal Heart Rate 150   Fundal Height (cm) 21 cm   Movement Present   Prenatal Vitals    Blood Pressure 110/64   Weight - Scale 60.3 kg (133 lb)   Urine Albumin/Glucose    Dilation/Effacement/Station    Vaginal Drainage    Draining Fluid No   Edema              General: Well appearing, no distress  Abdomen: Soft, gravid, nontender  Extremities: Non tender.  "

## 2024-09-08 NOTE — PROGRESS NOTES
Please refer to the [facility] ultrasound report in Ob Procedures for additional information regarding today's visit

## 2024-09-09 ENCOUNTER — ROUTINE PRENATAL (OUTPATIENT)
Dept: PERINATAL CARE | Facility: OTHER | Age: 39
End: 2024-09-09
Payer: COMMERCIAL

## 2024-09-09 VITALS
DIASTOLIC BLOOD PRESSURE: 60 MMHG | BODY MASS INDEX: 23.99 KG/M2 | HEART RATE: 111 BPM | HEIGHT: 63 IN | SYSTOLIC BLOOD PRESSURE: 102 MMHG | WEIGHT: 135.4 LBS

## 2024-09-09 DIAGNOSIS — O09.522 ELDERLY MULTIGRAVIDA, SECOND TRIMESTER: Primary | ICD-10-CM

## 2024-09-09 DIAGNOSIS — Z3A.22 22 WEEKS GESTATION OF PREGNANCY: ICD-10-CM

## 2024-09-09 DIAGNOSIS — Z36.86 ENCOUNTER FOR ANTENATAL SCREENING FOR CERVICAL LENGTH: ICD-10-CM

## 2024-09-09 PROCEDURE — 76817 TRANSVAGINAL US OBSTETRIC: CPT | Performed by: OBSTETRICS & GYNECOLOGY

## 2024-09-09 PROCEDURE — 99213 OFFICE O/P EST LOW 20 MIN: CPT | Performed by: OBSTETRICS & GYNECOLOGY

## 2024-09-09 PROCEDURE — 76811 OB US DETAILED SNGL FETUS: CPT | Performed by: OBSTETRICS & GYNECOLOGY

## 2024-09-09 NOTE — LETTER
September 9, 2024     Madison Carranza DO  [address]  [address]  [facility] [place] [address]    Patient: Naye Duran   YOB: 1985   Date of Visit: 9/9/2024       Dear Dr. Carranza:    Thank you for referring Naye Duran to me for evaluation. Below are my notes for this consultation.    If you have questions, please do not hesitate to call me. I look forward to following your patient along with you.         Sincerely,        Alex Salas MD        CC: No Recipients    Alex Salas MD  9/9/2024  1:05 PM  Sign when Signing Visit  Please refer to the [facility] ultrasound report in Ob Procedures for additional information regarding today's visit

## 2024-09-19 ENCOUNTER — TELEPHONE (OUTPATIENT)
Age: 39
End: 2024-09-19

## 2024-09-19 NOTE — TELEPHONE ENCOUNTER
For OB Navigator: Patient called regarding scheduling c/s. Attempted to reach office. Please call pt back to schedule

## 2024-10-03 ENCOUNTER — ROUTINE PRENATAL (OUTPATIENT)
Dept: OBGYN CLINIC | Facility: CLINIC | Age: 39
End: 2024-10-03
Payer: COMMERCIAL

## 2024-10-03 VITALS
HEIGHT: 63 IN | BODY MASS INDEX: 24.63 KG/M2 | DIASTOLIC BLOOD PRESSURE: 70 MMHG | WEIGHT: 139 LBS | SYSTOLIC BLOOD PRESSURE: 110 MMHG

## 2024-10-03 DIAGNOSIS — O99.891 BACTERIURIA DURING PREGNANCY: ICD-10-CM

## 2024-10-03 DIAGNOSIS — Z3A.25 25 WEEKS GESTATION OF PREGNANCY: Primary | ICD-10-CM

## 2024-10-03 DIAGNOSIS — Z30.2 REQUEST FOR STERILIZATION: ICD-10-CM

## 2024-10-03 DIAGNOSIS — O09.522 MULTIGRAVIDA OF ADVANCED MATERNAL AGE IN SECOND TRIMESTER: ICD-10-CM

## 2024-10-03 DIAGNOSIS — R82.71 BACTERIURIA DURING PREGNANCY: ICD-10-CM

## 2024-10-03 DIAGNOSIS — Z36.85 SCREENING, ANTENATAL, FOR STREPTOCOCCUS B: ICD-10-CM

## 2024-10-03 DIAGNOSIS — O34.211 MATERNAL CARE DUE TO LOW TRANSVERSE UTERINE SCAR FROM PREVIOUS CESAREAN DELIVERY: ICD-10-CM

## 2024-10-03 DIAGNOSIS — Z67.91 RH NEGATIVE STATE IN ANTEPARTUM PERIOD: ICD-10-CM

## 2024-10-03 DIAGNOSIS — O26.899 RH NEGATIVE STATE IN ANTEPARTUM PERIOD: ICD-10-CM

## 2024-10-03 LAB
SL AMB  POCT GLUCOSE, UA: NORMAL
SL AMB POCT URINE PROTEIN: NORMAL

## 2024-10-03 PROCEDURE — PNV: Performed by: OBSTETRICS & GYNECOLOGY

## 2024-10-03 PROCEDURE — 81002 URINALYSIS NONAUTO W/O SCOPE: CPT | Performed by: OBSTETRICS & GYNECOLOGY

## 2024-10-03 NOTE — PROGRESS NOTES
"Routine Prenatal Visit  [facility] - [place]  [address], [address], [place] [address]    Assessment/Plan:  Naye is a 39 y.o. year old  at 25w3d who presents for routine prenatal visit.     1. Screening, , for Streptococcus B  -     Glucose, 1H PG; Future  -     ABO/Rh; Future  -     Antibody screen; Future  -     CBC; Future  -     RPR, Rfx Qn RPR/Confirm TP; Future  -     Glucose, 1H PG  -     ABO/Rh  -     Antibody screen  -     CBC  -     RPR, Rfx Qn RPR/Confirm TP  2. 25 weeks gestation of pregnancy  -     POCT urine dip  3. Multigravida of advanced maternal age in first trimester  4. Maternal care due to low transverse uterine scar from previous  delivery  5. Request for sterilization  6. Bacteriuria during pregnancy        Subjective:     CC: Prenatal care    Naye Duran is a 39 y.o.  female who presents for routine prenatal care at 25w3d.  Pregnancy ROS: no  leakage of fluid, pelvic pain, or vaginal bleeding.  +  fetal movement.    The following portions of the patient's history were reviewed and updated as appropriate: allergies, current medications, past family history, past medical history, obstetric history, gynecologic history, past social history, past surgical history and problem list.      Objective:  /70 (BP Location: Right arm, Patient Position: Sitting, Cuff Size: Standard)   Ht 5' 3\" (1.6 m)   Wt 63 kg (139 lb)   LMP 2024 (Exact Date)   BMI 24.62 kg/m²   Pregravid Weight/BMI: 55.8 kg (123 lb) (BMI 21.79)  Current Weight: 63 kg (139 lb)   Total Weight Gain: 7.258 kg (16 lb)   Pre- Vitals      Flowsheet Row Most Recent Value   Prenatal Assessment    Prenatal Vitals    Blood Pressure 110/70   Weight - Scale 63 kg (139 lb)   Urine Albumin/Glucose    Dilation/Effacement/Station    Vaginal Drainage    Edema              General: Well appearing, no distress  Respiratory: Unlabored breathing  Cardiovascular: Regular " rate.  Abdomen: Soft, gravid, nontender  Fundal Height: Appropriate for gestational age.  Extremities: Warm and well perfused.  Non tender.

## 2024-10-22 LAB
EXTERNAL HEMOGLOBIN: 11 G/DL
EXTERNAL PLATELET COUNT: 356 K/ÂΜL
EXTERNAL RH FACTOR: NEGATIVE
EXTERNAL SYPHILIS TOTAL IGG/IGM SCREENING: NORMAL

## 2024-10-23 LAB
ABO GROUP BLD: NORMAL
BLD GP AB SCN SERPL QL: NEGATIVE
ERYTHROCYTE [DISTWIDTH] IN BLOOD BY AUTOMATED COUNT: 12.4 % (ref 11.7–15.4)
HCT VFR BLD AUTO: 33.8 % (ref 34–46.6)
HGB BLD-MCNC: 11 G/DL (ref 11.1–15.9)
MCH RBC QN AUTO: 29.6 PG (ref 26.6–33)
MCHC RBC AUTO-ENTMCNC: 32.5 G/DL (ref 31.5–35.7)
MCV RBC AUTO: 91 FL (ref 79–97)
PLATELET # BLD AUTO: 356 X10E3/UL (ref 150–450)
RBC # BLD AUTO: 3.72 X10E6/UL (ref 3.77–5.28)
RH BLD: NEGATIVE
RPR SER QL: NON REACTIVE
WBC # BLD AUTO: 11.4 X10E3/UL (ref 3.4–10.8)

## 2024-10-24 ENCOUNTER — ROUTINE PRENATAL (OUTPATIENT)
Dept: OBGYN CLINIC | Facility: CLINIC | Age: 39
End: 2024-10-24
Payer: COMMERCIAL

## 2024-10-24 VITALS
DIASTOLIC BLOOD PRESSURE: 72 MMHG | WEIGHT: 141 LBS | BODY MASS INDEX: 24.98 KG/M2 | HEIGHT: 63 IN | SYSTOLIC BLOOD PRESSURE: 106 MMHG

## 2024-10-24 DIAGNOSIS — O34.211 MATERNAL CARE DUE TO LOW TRANSVERSE UTERINE SCAR FROM PREVIOUS CESAREAN DELIVERY: ICD-10-CM

## 2024-10-24 DIAGNOSIS — O09.523 MULTIGRAVIDA OF ADVANCED MATERNAL AGE IN THIRD TRIMESTER: ICD-10-CM

## 2024-10-24 DIAGNOSIS — O99.891 BACTERIURIA DURING PREGNANCY: ICD-10-CM

## 2024-10-24 DIAGNOSIS — Z29.13 NEED FOR RHOGAM DUE TO RH NEGATIVE MOTHER: ICD-10-CM

## 2024-10-24 DIAGNOSIS — R82.71 BACTERIURIA DURING PREGNANCY: ICD-10-CM

## 2024-10-24 DIAGNOSIS — Z3A.28 28 WEEKS GESTATION OF PREGNANCY: Primary | ICD-10-CM

## 2024-10-24 DIAGNOSIS — Z30.2 REQUEST FOR STERILIZATION: ICD-10-CM

## 2024-10-24 LAB
SL AMB  POCT GLUCOSE, UA: NORMAL
SL AMB POCT URINE PROTEIN: NORMAL

## 2024-10-24 PROCEDURE — 96372 THER/PROPH/DIAG INJ SC/IM: CPT | Performed by: OBSTETRICS & GYNECOLOGY

## 2024-10-24 PROCEDURE — 81002 URINALYSIS NONAUTO W/O SCOPE: CPT | Performed by: OBSTETRICS & GYNECOLOGY

## 2024-10-24 PROCEDURE — PNV: Performed by: OBSTETRICS & GYNECOLOGY

## 2024-10-24 NOTE — PATIENT INSTRUCTIONS
The Third Trimester  (28-42 weeks)  YOUR BABY   * your baby sucks its thumb now!   * your baby can hear voices and respond to touch…..so talk to him or her!!   * your baby’s brain grows and develops most in the last 2 months of pregnancy   * baby’s head and bones are soft and flexible so they can fit through the birth canal   * baby’s movements change towards the end of pregnancy because there is less room for kicking and stretching in your belly   * baby’s lungs are not fully developed and completely ready to breathe on their own until the last 3-4 weeks before your due date    YOUR BODY   * your belly is growing a lot now   * it may become more difficult to sleep well at night or to be as active as you usually are   * you may sweat more than usual   * you will become more off-balance……be careful not to fall!!   * you may develop hemorrhoids (which can be painful and make it difficult to sit down)   * the last two months of pregnancy can become very uncomfortable……with backaches, headaches, and heartburn   * you can start to have contractions…….as long as they are irregular and less than 5 per hour, this is a normal part of your body getting ready to have a baby   * your cervix may start to thin out and open up……to get ready for delivery   * you may find yourself needing to “pee” very often…….because baby is pressing on your bladder so much   * you may get out of breathe more quickly than usual      FETAL KICK COUNTS    In the third trimester (after 28 weeks gestation) you should be performing fetal kick counts every day.  Your baby should move at least 10 times in 2 hours during an active time, once a day.    Choose atime of day when your baby is most active.  Try to do this around the same time each day.  Get into a comfortable position and then write down the time your baby first moves.  Count each movement until the baby moves 10 times.  These movements include kicks, punches, nudges, flutters, or rolls.  This  can take anywhere from 5 minutes to 2 hours.  Write down the time you feel the baby's 10th movement.    If 2 hours has passed and your baby has not moved at least 10 times, you should CALL THE OFFICE RIGHT AWAY.  [phone]          PREMATURE LABOR     When to call [phone]:  * I need to call immediately if I have even a small amount of LIQUID leaking from my vagina, with or without contractions.   * I need to call if I am BLEEDING from my vagina.   * I need to call if I am feeling CRAMPING that continues after drinking 2-3 glasses of water and lying down on my side for one hour and that feels like I am having a period.   * I need to call if I feel CONTRACTIONS  more than 4 times in an hour that feels like the baby is “balling up” even after I try drinking 2-3 glasses of water and lying down on my side for an hour.   * I need to call if I notice a change in my vaginal DISCHARGE.   * I need to call if I am feeling PELVIC PRESSURE  that feels like the baby is pushing down into my vagina and lasts more than an hour.   * I need to call if I have LOW BACKACHE which is new and near my tailbone.  It may either come and go several times during an hour or stay there constantly.          PRE-ECLAMPSIA     What is it?   Pre-eclampsia is a serious disease that can occur during pregnancy related to high blood pressures.  It can happen to any woman.     Why should I care?   Women who develop pre-eclampsia have serious risks which can include seizures, stroke, organ damage, premature birth of their baby.  In the very worst cases, it can cause death of the mother and/or their baby.     What should I pay attention to?   Signs and symptoms of pre-eclampsia can include:   * Severe swelling of face or hands    * A headache that will not go away even after you have taken Tylenol   * Seeing spots or changes in eyesight    * Pain in the upper abdomen or shoulder    * New nausea and vomiting (in the second half of pregnancy)    *  Sudden weight gain    * Difficulty breathing     What should I do?   If you experience any of the above symptoms of pre-eclampsia, contact your OB provider.  Finding pre-eclampsia early is important for you and your baby.  Call us       BREASTFEEDING     BENEFITS FOR BABIES   * stronger immune systems (less allergies, eczema, asthma, and childhood cancers)   * less diarrhea and constipation or other GI diseases   * fewer colds and ear infections   * better vision and teeth (fewer cavities)   * improves IQ   * lower rates of diabetes and obesity in childhood     BENEFITS FOR MOMS   * promotes faster weight loss after delivery   * lower risk for postpartum depression   * lower risk for breast, uterine, and ovarian cancers   * lower risk for osteoporosis developing with age   * easier than formula - is always right with you, clean, and the right temperature   * less expensive than formula……it’s FREE !!!!     KEYS TO SUCCESSFUL BREASTFEEDING   * keep baby skin-to-skin until after first feeding event   * keep baby in your room with you during your hospital stay after delivery   * avoid any bottle feedings (unless medically necessary)   * limit the use of pacifiers and swaddling   * ask for help if you are having any issues……lactation consultants (who specialize in breastfeeding) are available to help you   * a healthy diet for mom……eating a variety of foods and portions in moderation    THINGS YOU SHOULD KNOW ABOUT BREASTFEEDING   * most medications are considered compatible with breastfeeding by the American Academy of Pediatrics, but you should check with your health care provider or lactation consultant prior to taking a new medication……just to be sure it is safe   * alcohol (beer, wine, liquor) can be passed from mother to baby through breast milk……an occasional, social drink is deemed acceptable by the American Academy of Pediatrics…..more than that should be avoided   * breastfeeding is NOT an effective method of  birth control   * nicotine (in cigarettes) can pass from mother to baby through breast milk…..however, for mothers who smoke, it is still healthier to breastfeed than use formula   * caffeine should be limited to 1-3 cups per day……includes coffee, soda, energy drinks         PERINEAL / VAGINAL MASSAGE    What can I do now to decrease my chances of tearing during delivery?  Massaging around the vaginal opening by you (or your partner), either antepartum (before birth) or during the second stage of labor, can reduce the likelihood of perineal tearing during childbirth.  Likewise, the use of warm packs held on the perineum during the pushing stage of labor can reduce the severity of your tear.  This will happen during the pushing stage of labor.  At home, you can also help reduce the chances of injury that may occur during the birth of your child through perineal massage.    When should I do this?  Starting around or shortly after 34 weeks of pregnancy, you or your partner should provide 5-10 minutes of vaginal massage 1-4 times per week.    How?  Use either almond, coconut, or olive oil and water mixture on 1 or 2 fingers (depending on comfort).  Insert finger(s) 3-5cm into the vagina.  Apply sweeping downward/sideward pressure from 3 to 9 o'clock for 5-10 minutes, 1-4 times per week.          WARNING SIGNS DURING PREGNANCY  Call our office at [phone] if you experience any of the followin. Vaginal bleeding  2. Sharp abdominal pain that does not go away  3. Fever (more than 100.4 and is not relieved by Tylenol)  4. Persistent vomiting lasting greater than 24 hours  5. Chest pain   6. Pain or burning when you urinate  7. Severe headache that doesn't resolve with Tylenol  8. Blurred vision or seeing spots in your vision  9. Sudden swelling of your face or hands  10. Redness, swelling or pain in a leg  11. A sudden weight gain in just a few days  12. Decrease in your baby's movement (after 28 weeks or the 6th  month of pregnancy)  13. A loss of watery fluid from your vagina - can be a gush, a trickle or continuous wetness  14. After 20 weeks of pregnancy, rhythmic cramping (greater than 4 per hour) or menstrual like low/pelvic pain          VACCINES IN PREGNANCY    TDAP  Whopping cough (or pertusSsis) can be serious for anyone, but for your , it can be life-threatning.  Up to 20 babies die each year in the [place] Due to whopping cough.  About half of babies younger than 1 year old who get whopping cough need treatment in the hospital.  The younger the baby is when he or she gets whopping cough, the more likely he or she will need to be treated in a hospital.  When you receive the whopping cough vaccine (Tdap) during your pregnancy, your body will create protective antibodies and pass some of them to your baby before birth.  These antibodies can help protect your baby from getting whopping cough until they are old enough to be vaccinated themselves (usually around 6 months of age).    INFLUENZA  Changes in your immune, heart, and lung functions during pregnancy make you more likely to get seriously ill from the flu.  Catching the flu also increases your chances for serious problems for your developing baby, including premature labor and delivery.  It is recommended that all women who are pregnant during flu season should receive an influenza vaccine.

## 2024-10-24 NOTE — PROGRESS NOTES
"Routine Prenatal Visit  [facility] - [place]  [address], [address], [place] [address]    Assessment/Plan:  Naye is a 39 y.o. year old  at 28w3d who presents for routine prenatal visit.     1. Multigravida of advanced maternal age in third trimester  Comments:  Declines  tdap, flu shot and RSV.   Christopher glucose  monitor and sugars wnl per pt .  2. Maternal care due to low transverse uterine scar from previous  delivery  3. Request for sterilization  4. 28 weeks gestation of pregnancy  -     POCT urine dip  5. Need for rhogam due to Rh negative mother  Comments:  rhig given  Orders:  -     Rho(D) immune globulin (RHOGAM ULTRA-FILTERED PLUS) IM injection 300 mcg  6. Bacteriuria during pregnancy  Comments:  needs clean catch next visit        Subjective:     CC: Prenatal care    Naye Duran is a 39 y.o.  female who presents for routine prenatal care at 28w3d.  Pregnancy ROS: no  leakage of fluid, pelvic pain, or vaginal bleeding.  + fetal movement.    The following portions of the patient's history were reviewed and updated as appropriate: allergies, current medications, past family history, past medical history, obstetric history, gynecologic history, past social history, past surgical history and problem list.      Objective:  /72 (BP Location: Right arm, Patient Position: Sitting, Cuff Size: Standard)   Ht 5' 3\" (1.6 m)   Wt 64 kg (141 lb)   LMP 2024 (Exact Date)   BMI 24.98 kg/m²   Pregravid Weight/BMI: 55.8 kg (123 lb) (BMI 21.79)  Current Weight: 64 kg (141 lb)   Total Weight Gain: 8.165 kg (18 lb)   Pre- Vitals      Flowsheet Row Most Recent Value   Prenatal Assessment    Prenatal Vitals    Blood Pressure 106/72   Weight - Scale 64 kg (141 lb)   Urine Albumin/Glucose    Dilation/Effacement/Station    Vaginal Drainage    Edema              General: Well appearing, no distress  Respiratory: Unlabored breathing  Cardiovascular: Regular " rate.  Abdomen: Soft, gravid, nontender  Fundal Height: Appropriate for gestational age.  Extremities: Warm and well perfused.  Non tender.

## 2024-11-02 PROBLEM — Z36.85 SCREENING, ANTENATAL, FOR STREPTOCOCCUS B: Status: RESOLVED | Noted: 2024-10-03 | Resolved: 2024-11-02

## 2024-11-06 PROBLEM — O09.523 MULTIGRAVIDA OF ADVANCED MATERNAL AGE IN THIRD TRIMESTER: Status: ACTIVE | Noted: 2024-07-09

## 2024-11-06 PROBLEM — Z3A.30 30 WEEKS GESTATION OF PREGNANCY: Status: ACTIVE | Noted: 2024-10-03

## 2024-11-07 ENCOUNTER — ROUTINE PRENATAL (OUTPATIENT)
Dept: OBGYN CLINIC | Facility: CLINIC | Age: 39
End: 2024-11-07
Payer: COMMERCIAL

## 2024-11-07 VITALS
WEIGHT: 145 LBS | DIASTOLIC BLOOD PRESSURE: 72 MMHG | BODY MASS INDEX: 25.69 KG/M2 | HEIGHT: 63 IN | SYSTOLIC BLOOD PRESSURE: 122 MMHG

## 2024-11-07 DIAGNOSIS — Z3A.30 30 WEEKS GESTATION OF PREGNANCY: ICD-10-CM

## 2024-11-07 DIAGNOSIS — R82.71 BACTERIURIA DURING PREGNANCY: ICD-10-CM

## 2024-11-07 DIAGNOSIS — O34.211 MATERNAL CARE DUE TO LOW TRANSVERSE UTERINE SCAR FROM PREVIOUS CESAREAN DELIVERY: Primary | ICD-10-CM

## 2024-11-07 DIAGNOSIS — Z30.2 REQUEST FOR STERILIZATION: ICD-10-CM

## 2024-11-07 DIAGNOSIS — O99.891 BACTERIURIA DURING PREGNANCY: ICD-10-CM

## 2024-11-07 DIAGNOSIS — Z67.91 RH NEGATIVE STATE IN ANTEPARTUM PERIOD: ICD-10-CM

## 2024-11-07 DIAGNOSIS — O09.523 MULTIGRAVIDA OF ADVANCED MATERNAL AGE IN THIRD TRIMESTER: ICD-10-CM

## 2024-11-07 DIAGNOSIS — R35.0 URINARY FREQUENCY: ICD-10-CM

## 2024-11-07 DIAGNOSIS — O26.899 RH NEGATIVE STATE IN ANTEPARTUM PERIOD: ICD-10-CM

## 2024-11-07 LAB
SL AMB  POCT GLUCOSE, UA: ABNORMAL
SL AMB LEUKOCYTE ESTERASE,UA: ABNORMAL
SL AMB POCT KETONES,UA: ABNORMAL
SL AMB POCT URINE PROTEIN: ABNORMAL

## 2024-11-07 PROCEDURE — PNV: Performed by: OBSTETRICS & GYNECOLOGY

## 2024-11-07 PROCEDURE — 81002 URINALYSIS NONAUTO W/O SCOPE: CPT | Performed by: OBSTETRICS & GYNECOLOGY

## 2024-11-07 NOTE — ASSESSMENT & PLAN NOTE
"- patient declined 1hr GTT screening for GDM at 28 weeks.  Reports she wore a continuous glucose monitor for 2 weeks and sugars \"normal\".  She did not do finger sticks.  She has values on brandy.  Discussed CGM are not validated in pregnant patients so finger sticks are preferred but if she has that data and can share we will put in chart.  - She showed me daily graph on her phone.  I asked her to do screen shot and send me 7 days of monitoring around 28 weeks so I can review.  She agrees.  "
E coli in urine first trimster.  No symptoms but treated.  Now some increase in frequency.  UA in office trace LE and ketones.  Will send for culture.  
Growth u/s pedro'd 11/18/2024.  
Received [facility] 10/24/2024.  
Repeat LTCS and bilateral salpingectomy scheduled.  
Patient

## 2024-11-07 NOTE — PROGRESS NOTES
"Routine Prenatal Visit  [facility] - [place]  [address], [address], [place] [address]    Assessment/Plan:  Naye is a 39 y.o. year old  at 30w3d who presents for routine prenatal visit.     1. Maternal care due to low transverse uterine scar from previous  delivery  Assessment & Plan:  Repeat LTCS and bilateral salpingectomy scheduled.  2. Request for sterilization  3. Rh negative state in antepartum period  Assessment & Plan:  Received Rhogam 10/24/2024.  4. Multigravida of advanced maternal age in third trimester  Assessment & Plan:  Growth u/s pedro'd 2024.  5. Bacteriuria during pregnancy  Assessment & Plan:  E coli in urine first trimster.  No symptoms but treated.  Now some increase in frequency.  UA in office trace LE and ketones.  Will send for culture.  Orders:  -     Urine culture  6. Urinary frequency  -     Urine culture  7. 30 weeks gestation of pregnancy  Assessment & Plan:  - patient declined 1hr GTT screening for GDM at 28 weeks.  Reports she wore a continuous glucose monitor for 2 weeks and sugars \"normal\".  She did not do finger sticks.  She has values on brandy.  Discussed CGM are not validated in pregnant patients so finger sticks are preferred but if she has that data and can share we will put in chart.  - She showed me daily graph on her phone.  I asked her to do screen shot and send me 7 days of monitoring around 28 weeks so I can review.  She agrees.  Orders:  -     POCT urine dip      Next OB Visit 2 weeks.    Subjective:     CC: Prenatal care    Naye Duran is a 39 y.o.  female who presents for routine prenatal care at 30w3d.  Pregnancy ROS: no leakage of fluid, pelvic pain, or vaginal bleeding.  normal fetal movement.    The following portions of the patient's history were reviewed and updated as appropriate: allergies, current medications, past family history, past medical history, obstetric history, gynecologic history, past social " "history, past surgical history and problem list.      Objective:  /72 (BP Location: Left arm, Patient Position: Sitting, Cuff Size: Standard)   Ht 5' 3\" (1.6 m)   Wt 65.8 kg (145 lb)   LMP 2024 (Exact Date)   BMI 25.69 kg/m²   Pregravid Weight/BMI: 55.8 kg (123 lb) (BMI 21.79)  Current Weight: 65.8 kg (145 lb)   Total Weight Gain: 9.979 kg (22 lb)   Pre- Vitals      Flowsheet Row Most Recent Value   Prenatal Assessment    Fetal Heart Rate 140   Fundal Height (cm) 30 cm   Movement Present   Prenatal Vitals    Blood Pressure 122/72   Weight - Scale 65.8 kg (145 lb)   Urine Albumin/Glucose    Dilation/Effacement/Station    Vaginal Drainage    Edema              General: Well appearing, no distress  Abdomen: Soft, gravid, nontender  Extremities: Non tender.  "

## 2024-11-08 ENCOUNTER — TELEPHONE (OUTPATIENT)
Age: 39
End: 2024-11-08

## 2024-11-08 ENCOUNTER — PATIENT MESSAGE (OUTPATIENT)
Dept: OBGYN CLINIC | Facility: CLINIC | Age: 39
End: 2024-11-08

## 2024-11-08 DIAGNOSIS — Z3A.30 30 WEEKS GESTATION OF PREGNANCY: Primary | ICD-10-CM

## 2024-11-08 NOTE — PATIENT COMMUNICATION
Naye sent screen shot of CGM results from 10/20/2024 to 10/30/2024.  Fasting appears <90 and rarely is any value > 120 even after meals.  In Media section of chart.

## 2024-11-08 NOTE — TELEPHONE ENCOUNTER
Christopher calling stating that a urine sample was received but no requisition order came with it. Christopher was notified that there was a urine culture ordered but not a UA, as there was a urine dip completed in the office, christopher verbalized understanding. No further questions.

## 2024-11-11 LAB
BACTERIA UR CULT: ABNORMAL
Lab: ABNORMAL
SL AMB ANTIMICROBIAL SUSCEPTIBILITY: ABNORMAL

## 2024-11-11 RX ORDER — NITROFURANTOIN 25; 75 MG/1; MG/1
100 CAPSULE ORAL 2 TIMES DAILY
Qty: 10 CAPSULE | Refills: 0 | Status: SHIPPED | OUTPATIENT
Start: 2024-11-11

## 2024-11-12 ENCOUNTER — TELEPHONE (OUTPATIENT)
Dept: OBGYN CLINIC | Facility: CLINIC | Age: 39
End: 2024-11-12

## 2024-11-12 NOTE — TELEPHONE ENCOUNTER
3rd trimester call - 31 wk 1 day    Overall how are you feeling? Patient feels well, no complaints    Compliant with routine OB appointments? Yes - scheduled to 38 wks    Have you completed your 3rd trimester lab work? Patient had checked blood sugars instead in doing 1 hr glucose    Have you reviewed the contents of 3rd trimester folder from office?   Have you decided on a pediatrician?   - will [facility]     Questions on paperwork to go back to office? no  Questions on the baby birth certificate and photography forms? no    Send link for the Hospital Readiness Video via Knock Knock - sent to patient via Knock Knock on 11/12/2024

## 2024-11-18 ENCOUNTER — ULTRASOUND (OUTPATIENT)
Dept: PERINATAL CARE | Facility: OTHER | Age: 39
End: 2024-11-18
Payer: COMMERCIAL

## 2024-11-18 VITALS
SYSTOLIC BLOOD PRESSURE: 112 MMHG | DIASTOLIC BLOOD PRESSURE: 60 MMHG | WEIGHT: 145 LBS | BODY MASS INDEX: 25.69 KG/M2 | HEART RATE: 105 BPM | HEIGHT: 63 IN

## 2024-11-18 DIAGNOSIS — Z3A.31 31 WEEKS GESTATION OF PREGNANCY: Primary | ICD-10-CM

## 2024-11-18 DIAGNOSIS — O09.523 MULTIGRAVIDA OF ADVANCED MATERNAL AGE IN THIRD TRIMESTER: ICD-10-CM

## 2024-11-18 PROCEDURE — 76816 OB US FOLLOW-UP PER FETUS: CPT | Performed by: OBSTETRICS & GYNECOLOGY

## 2024-11-18 PROCEDURE — 99212 OFFICE O/P EST SF 10 MIN: CPT | Performed by: OBSTETRICS & GYNECOLOGY

## 2024-11-18 NOTE — PROGRESS NOTES
The patient was seen today for an ultrasound.  Please see ultrasound report (located under Ob Procedures) for additional details.   Thank you very much for allowing us to participate in the care of this very nice patient.  Should you have any questions, please do not hesitate to contact me.     Evangelist Daily MD FACOG  Attending Physician, Maternal-Fetal Medicine  [facility]

## 2024-11-19 ENCOUNTER — RESULTS FOLLOW-UP (OUTPATIENT)
Age: 39
End: 2024-11-19

## 2024-11-20 ENCOUNTER — ROUTINE PRENATAL (OUTPATIENT)
Dept: OBGYN CLINIC | Facility: CLINIC | Age: 39
End: 2024-11-20
Payer: COMMERCIAL

## 2024-11-20 VITALS
BODY MASS INDEX: 25.52 KG/M2 | SYSTOLIC BLOOD PRESSURE: 138 MMHG | WEIGHT: 144 LBS | DIASTOLIC BLOOD PRESSURE: 62 MMHG | HEIGHT: 63 IN

## 2024-11-20 DIAGNOSIS — Z67.91 RH NEGATIVE STATE IN ANTEPARTUM PERIOD: ICD-10-CM

## 2024-11-20 DIAGNOSIS — O26.899 RH NEGATIVE STATE IN ANTEPARTUM PERIOD: ICD-10-CM

## 2024-11-20 DIAGNOSIS — Z3A.32 32 WEEKS GESTATION OF PREGNANCY: Primary | ICD-10-CM

## 2024-11-20 DIAGNOSIS — O09.523 MULTIGRAVIDA OF ADVANCED MATERNAL AGE IN THIRD TRIMESTER: ICD-10-CM

## 2024-11-20 DIAGNOSIS — Z30.2 REQUEST FOR STERILIZATION: ICD-10-CM

## 2024-11-20 DIAGNOSIS — O34.211 MATERNAL CARE DUE TO LOW TRANSVERSE UTERINE SCAR FROM PREVIOUS CESAREAN DELIVERY: ICD-10-CM

## 2024-11-20 DIAGNOSIS — Z87.440 HISTORY OF UTI: ICD-10-CM

## 2024-11-20 LAB
SL AMB  POCT GLUCOSE, UA: NORMAL
SL AMB POCT URINE PROTEIN: NORMAL

## 2024-11-20 PROCEDURE — 81002 URINALYSIS NONAUTO W/O SCOPE: CPT | Performed by: OBSTETRICS & GYNECOLOGY

## 2024-11-20 PROCEDURE — PNV: Performed by: OBSTETRICS & GYNECOLOGY

## 2024-11-20 NOTE — PROGRESS NOTES
"Routine Prenatal Visit  [facility] - [place]  [address], [address], [place] [address]    Assessment/Plan:  Naye is a 39 y.o. year old  at 32w2d who presents for routine prenatal visit.     Assessment & Plan  32 weeks gestation of pregnancy    Orders:    POCT urine dip    Multigravida of advanced maternal age in third trimester         Maternal care due to low transverse uterine scar from previous  delivery     -  repeat C/S scheduled       Rh negative state in antepartum period         Request for sterilization         History of UTI           Next OB Visit 2 weeks.    Subjective:     CC: Prenatal care    Naye Duran is a 39 y.o.  female who presents for routine prenatal care at 32w2d.  Pregnancy ROS: no leakage of fluid, pelvic pain, or vaginal bleeding.  normal fetal movement.    The following portions of the patient's history were reviewed and updated as appropriate: allergies, current medications, past family history, past medical history, obstetric history, gynecologic history, past social history, past surgical history and problem list.      Objective:  /62 (BP Location: Left arm, Patient Position: Sitting, Cuff Size: Standard)   Ht 5' 3\" (1.6 m)   Wt 65.3 kg (144 lb)   LMP 2024 (Exact Date)   BMI 25.51 kg/m²   Pregravid Weight/BMI: 55.8 kg (123 lb) (BMI 21.79)  Current Weight: 65.3 kg (144 lb)   Total Weight Gain: 9.526 kg (21 lb)   Pre-[place] Vitals      Flowsheet Row Most Recent Value   Prenatal Assessment    Fetal Heart Rate 140   Fundal Height (cm) 32 cm   Movement Present   Prenatal Vitals    Blood Pressure 138/62   Weight - Scale 65.3 kg (144 lb)   Urine Albumin/Glucose    Dilation/Effacement/Station    Vaginal Drainage    Draining Fluid No   Edema              General: Well appearing, no distress  Abdomen: Soft, gravid, nontender  Extremities: Non tender.  "

## 2024-12-05 ENCOUNTER — ROUTINE PRENATAL (OUTPATIENT)
Dept: OBGYN CLINIC | Facility: CLINIC | Age: 39
End: 2024-12-05
Payer: COMMERCIAL

## 2024-12-05 VITALS
BODY MASS INDEX: 25.34 KG/M2 | DIASTOLIC BLOOD PRESSURE: 58 MMHG | WEIGHT: 143 LBS | HEIGHT: 63 IN | SYSTOLIC BLOOD PRESSURE: 110 MMHG

## 2024-12-05 DIAGNOSIS — O26.899 RH NEGATIVE STATE IN ANTEPARTUM PERIOD: ICD-10-CM

## 2024-12-05 DIAGNOSIS — O99.891 BACTERIURIA DURING PREGNANCY: ICD-10-CM

## 2024-12-05 DIAGNOSIS — Z30.2 REQUEST FOR STERILIZATION: ICD-10-CM

## 2024-12-05 DIAGNOSIS — Z87.440 HISTORY OF UTI: ICD-10-CM

## 2024-12-05 DIAGNOSIS — Z67.91 RH NEGATIVE STATE IN ANTEPARTUM PERIOD: ICD-10-CM

## 2024-12-05 DIAGNOSIS — R82.71 BACTERIURIA DURING PREGNANCY: ICD-10-CM

## 2024-12-05 DIAGNOSIS — Z3A.34 34 WEEKS GESTATION OF PREGNANCY: Primary | ICD-10-CM

## 2024-12-05 DIAGNOSIS — O09.523 MULTIGRAVIDA OF ADVANCED MATERNAL AGE IN THIRD TRIMESTER: ICD-10-CM

## 2024-12-05 DIAGNOSIS — O34.211 MATERNAL CARE DUE TO LOW TRANSVERSE UTERINE SCAR FROM PREVIOUS CESAREAN DELIVERY: ICD-10-CM

## 2024-12-05 LAB
SL AMB  POCT GLUCOSE, UA: NORMAL
SL AMB POCT URINE PROTEIN: NORMAL

## 2024-12-05 PROCEDURE — 81002 URINALYSIS NONAUTO W/O SCOPE: CPT | Performed by: OBSTETRICS & GYNECOLOGY

## 2024-12-05 PROCEDURE — PNV: Performed by: OBSTETRICS & GYNECOLOGY

## 2024-12-05 NOTE — PATIENT INSTRUCTIONS
LABOR PRECAUTIONS  Call our office at [phone] for any of the following:    * I need to call immediately I if I have even a small amount of LIQUID  leaking from my vagina, with or without contractions.   * I need to call if I am BLEEDING an amount equal to or more than a period.  A small amount of bloody vaginal discharge is normal at the end of the pregnancy.   * I need to call if I am having CONTRACTIONS  every five minutes for at least an hour.  I will need a watch in order to time them.  I should time them from the beginning of one contraction until the beginning of the next one.   * I need to call BEFORE  I go to the hospital.   * I need to have a plan for TRANSPORTATION  to get to the hospital when I am in labor.  Labor is generally not an emergency which requires an ambulance.          FETAL KICK COUNTS    In the third trimester (after 28 weeks gestation) you should be performing fetal kick counts every day.  Your baby should move at least 10 times in 2 hours during an active time, once a day.    Choose atime of day when your baby is most active.  Try to do this around the same time each day.  Get into a comfortable position and then write down the time your baby first moves.  Count each movement until the baby moves 10 times.  These movements include kicks, punches, nudges, flutters, or rolls.  This can take anywhere from 5 minutes to 2 hours.  Write down the time you feel the baby's 10th movement.    If 2 hours has passed and your baby has not moved at least 10 times, you should CALL THE OFFICE RIGHT AWAY.  [phone]        PERINEAL / VAGINAL MASSAGE    What can I do now to decrease my chances of tearing during delivery?  Massaging around the vaginal opening by you (or your partner), either antepartum (before birth) or during the second stage of labor, can reduce the likelihood of perineal tearing during childbirth.  Likewise, the use of warm packs held on the perineum during the pushing stage of  labor can reduce the severity of your tear.  This will happen during the pushing stage of labor.  At home, you can also help reduce the chances of injury that may occur during the birth of your child through perineal massage.    When should I do this?  Starting around or shortly after 34 weeks of pregnancy, you or your partner should provide 5-10 minutes of vaginal massage 1-4 times per week.    How?  Use either almond, coconut, or olive oil and water mixture on 1 or 2 fingers (depending on comfort).  Insert finger(s) 3-5cm into the vagina.  Apply sweeping downward/sideward pressure from 3 to 9 o'clock for 5-10 minutes, 1-4 times per week.        GROUP B STREP    Group B Strep (GBS) is a common vaginal bacteria.  Approximately 25% of women normally have GBS bacteria present in the vagina.  It is NOT a sexually-transmitted infection.  In fact, it is not an infection AT ALL!  It is just a normal vaginal bacteria for many women.    However, the GBS bacteria can be dangerous to a  baby if the baby is exposed to that particular bacteria during labor and birth AND develops an infection from it.  The likelihood of a  GBS infection for a woman who has GBS bacteria in the vagina is about 1%-2%.  But if it does occur, a baby could become severely ill.    For this reason, we do a vaginal culture (Q-tip swab of the vagina and rectum) for ALL pregnant women at approximately 36 weeks of pregnancy.  If the culture shows that there is GBS bacteria present, it is NOT a reason to panic!  Because in this situation we will give this woman antibiotics through her IV while she is in labor.  When a mother is treated with antibiotics during labor and delivery, her baby ALMOST NEVER becomes infected with GBS bacteria.

## 2024-12-05 NOTE — PROGRESS NOTES
"Routine Prenatal Visit  [facility] - [place]  [address], [address], [place] [address]    Assessment/Plan:  Naye is a 39 y.o. year old  at 34w3d who presents for routine prenatal visit.     1. 34 weeks gestation of pregnancy  -     POCT urine dip  2. Multigravida of advanced maternal age in third trimester  3. Maternal care due to low transverse uterine scar from previous  delivery  4. Rh negative state in antepartum period  5. Request for sterilization  6. Bacteriuria during pregnancy  -     Urine culture  7. History of UTI  -     Urine culture    + fm no utcx,  clean catch urine obtained and sent for culture.  N o dysuria or frequency.   Planned repeat  C?S .  Reviewed S+S of PIH, PTL and PROM.   Declines   RSV  vaccine.     Subjective:     CC: Prenatal care    Naye Duran is a 39 y.o.  female who presents for routine prenatal care at 34w3d.  Pregnancy ROS: no  leakage of fluid, pelvic pain, or vaginal bleeding.  + fetal movement.    The following portions of the patient's history were reviewed and updated as appropriate: allergies, current medications, past family history, past medical history, obstetric history, gynecologic history, past social history, past surgical history and problem list.      Objective:  /58 (BP Location: Right arm, Patient Position: Sitting, Cuff Size: Standard)   Ht 5' 3\" (1.6 m)   Wt 64.9 kg (143 lb)   LMP 2024 (Exact Date)   BMI 25.33 kg/m²   Pregravid Weight/BMI: 55.8 kg (123 lb) (BMI 21.79)  Current Weight: 64.9 kg (143 lb)   Total Weight Gain: 9.072 kg (20 lb)   Pre-Chetna Vitals      Flowsheet Row Most Recent Value   Prenatal Assessment    Fetal Heart Rate 144   Fundal Height (cm) 34 cm   Movement Present   Prenatal Vitals    Blood Pressure 110/58   Weight - Scale 64.9 kg (143 lb)   Urine Albumin/Glucose    Dilation/Effacement/Station    Vaginal Drainage    Draining Fluid No   Edema    LLE Edema None   RLE Edema " None   Facial Edema None             General: Well appearing, no distress  Respiratory: Unlabored breathing  Cardiovascular: Regular rate.  Abdomen: Soft, gravid, nontender  Fundal Height: Appropriate for gestational age.  Extremities: Warm and well perfused.  Non tender.

## 2024-12-07 LAB
BACTERIA UR CULT: NORMAL
Lab: NO GROWTH

## 2024-12-09 ENCOUNTER — RESULTS FOLLOW-UP (OUTPATIENT)
Dept: OBGYN CLINIC | Facility: CLINIC | Age: 39
End: 2024-12-09

## 2024-12-18 PROBLEM — Z3A.36 36 WEEKS GESTATION OF PREGNANCY: Status: ACTIVE | Noted: 2024-10-03

## 2024-12-20 ENCOUNTER — ROUTINE PRENATAL (OUTPATIENT)
Dept: OBGYN CLINIC | Facility: CLINIC | Age: 39
End: 2024-12-20
Payer: COMMERCIAL

## 2024-12-20 VITALS — SYSTOLIC BLOOD PRESSURE: 100 MMHG | BODY MASS INDEX: 26.43 KG/M2 | WEIGHT: 149.2 LBS | DIASTOLIC BLOOD PRESSURE: 62 MMHG

## 2024-12-20 DIAGNOSIS — O34.211 MATERNAL CARE DUE TO LOW TRANSVERSE UTERINE SCAR FROM PREVIOUS CESAREAN DELIVERY: Primary | ICD-10-CM

## 2024-12-20 DIAGNOSIS — Z36.85 ANTENATAL SCREENING FOR STREPTOCOCCUS B: ICD-10-CM

## 2024-12-20 DIAGNOSIS — O09.523 MULTIGRAVIDA OF ADVANCED MATERNAL AGE IN THIRD TRIMESTER: ICD-10-CM

## 2024-12-20 DIAGNOSIS — Z30.2 REQUEST FOR STERILIZATION: ICD-10-CM

## 2024-12-20 DIAGNOSIS — Z3A.36 36 WEEKS GESTATION OF PREGNANCY: ICD-10-CM

## 2024-12-20 DIAGNOSIS — O22.43 HEMORRHOIDS DURING PREGNANCY IN THIRD TRIMESTER: ICD-10-CM

## 2024-12-20 LAB
SL AMB  POCT GLUCOSE, UA: NEGATIVE
SL AMB POCT URINE PROTEIN: NEGATIVE

## 2024-12-20 PROCEDURE — PNV: Performed by: OBSTETRICS & GYNECOLOGY

## 2024-12-20 PROCEDURE — 81002 URINALYSIS NONAUTO W/O SCOPE: CPT | Performed by: OBSTETRICS & GYNECOLOGY

## 2024-12-20 RX ORDER — HYDROCORTISONE ACETATE 25 MG/1
25 SUPPOSITORY RECTAL 2 TIMES DAILY
Qty: 12 SUPPOSITORY | Refills: 0 | Status: SHIPPED | OUTPATIENT
Start: 2024-12-20

## 2024-12-20 NOTE — PROGRESS NOTES
Routine Prenatal Visit  [facility] - [place]  [address], [address], [place] [address]    Assessment/Plan:  Naye is a 39 y.o. year old  at 36w4d who presents for routine prenatal visit.     Assessment & Plan  Maternal care due to low transverse uterine scar from previous  delivery  Repeat LTCS scheduled w/ BS.  Consent signed today and in chart.  All questions reviewed.  Hibiclens provided.      Request for sterilization  Consent reviewed for bilateral salpingectomy.    Multigravida of advanced maternal age in third trimester     screening for streptococcus B    Orders:    Strep Gp B NOREEN+Rflx    Hemorrhoids during pregnancy in third trimester  Hemorrhoids started this week.  Trying tucks and Prep H, not much improvement.  No constipation.  Recom off feet as much as possible, support band.  Sent Anusol suppositories to pharmacy if worsen.    Orders:    hydrocortisone (ANUSOL-HC) 25 mg suppository; Insert 1 suppository (25 mg total) into the rectum 2 (two) times a day    36 weeks gestation of pregnancy    Orders:    POCT urine dip      Next OB Visit 1 weeks.    Subjective:     CC: Prenatal care    Naye Duran is a 39 y.o.  female who presents for routine prenatal care at 36w4d.  Pregnancy ROS: no leakage of fluid, pelvic pain, or vaginal bleeding.  normal fetal movement.    The following portions of the patient's history were reviewed and updated as appropriate: allergies, current medications, past family history, past medical history, obstetric history, gynecologic history, past social history, past surgical history and problem list.      Objective:  /62   Wt 67.7 kg (149 lb 3.2 oz)   LMP 2024 (Exact Date)   BMI 26.43 kg/m²   Pregravid Weight/BMI: 55.8 kg (123 lb) (BMI 21.79)  Current Weight: 67.7 kg (149 lb 3.2 oz)   Total Weight Gain: 11.9 kg (26 lb 3.2 oz)   Pre-Chetna Vitals      Flowsheet Row Most Recent Value   Prenatal Assessment     Fetal Heart Rate 150   Fundal Height (cm) 36 cm   Movement Present   Presentation Vertex   Prenatal Vitals    Blood Pressure 100/62   Weight - Scale 67.7 kg (149 lb 3.2 oz)   Urine Albumin/Glucose    Dilation/Effacement/Station    Vaginal Drainage    Edema    LLE Edema None   RLE Edema None             General: Well appearing, no distress  Abdomen: Soft, gravid, nontender  Extremities: Non tender.

## 2024-12-20 NOTE — ASSESSMENT & PLAN NOTE
Repeat LTCS scheduled w/ BS.  Consent signed today and in chart.  All questions reviewed.  Hibiclens provided.

## 2024-12-22 LAB — GP B STREP DNA SPEC QL NAA+PROBE: NEGATIVE

## 2024-12-25 PROBLEM — Z3A.37 37 WEEKS GESTATION OF PREGNANCY: Status: ACTIVE | Noted: 2024-10-03

## 2024-12-26 ENCOUNTER — ROUTINE PRENATAL (OUTPATIENT)
Dept: OBGYN CLINIC | Facility: CLINIC | Age: 39
End: 2024-12-26
Payer: COMMERCIAL

## 2024-12-26 VITALS — WEIGHT: 151 LBS | SYSTOLIC BLOOD PRESSURE: 130 MMHG | BODY MASS INDEX: 26.75 KG/M2 | DIASTOLIC BLOOD PRESSURE: 58 MMHG

## 2024-12-26 DIAGNOSIS — O34.211 MATERNAL CARE DUE TO LOW TRANSVERSE UTERINE SCAR FROM PREVIOUS CESAREAN DELIVERY: Primary | ICD-10-CM

## 2024-12-26 DIAGNOSIS — Z3A.37 37 WEEKS GESTATION OF PREGNANCY: ICD-10-CM

## 2024-12-26 DIAGNOSIS — Z30.2 REQUEST FOR STERILIZATION: ICD-10-CM

## 2024-12-26 DIAGNOSIS — O22.43 HEMORRHOIDS DURING PREGNANCY IN THIRD TRIMESTER: ICD-10-CM

## 2024-12-26 LAB
SL AMB  POCT GLUCOSE, UA: NEGATIVE
SL AMB POCT URINE PROTEIN: NEGATIVE

## 2024-12-26 PROCEDURE — PNV: Performed by: OBSTETRICS & GYNECOLOGY

## 2024-12-26 PROCEDURE — 81002 URINALYSIS NONAUTO W/O SCOPE: CPT | Performed by: OBSTETRICS & GYNECOLOGY

## 2024-12-26 NOTE — PROGRESS NOTES
Routine Prenatal Visit  [facility] [address], [address], [place] [address]    Assessment/Plan:  Naye is a 39 y.o. year old  at 37w3d who presents for routine prenatal visit.     Assessment & Plan  Maternal care due to low transverse uterine scar from previous  delivery  Repeat LTCS scheduled 2024.       Request for sterilization  Wishes sterilization, scheduled for bilateral salpingectomy at time of .       Hemorrhoids during pregnancy in third trimester  Symptoms have improved.  Has not needed Anusol but has if worsen.       37 weeks gestation of pregnancy    Orders:    POCT urine dip      Next OB Visit 1 weeks.    Subjective:     CC: Prenatal care    Naye Duran is a 39 y.o.  female who presents for routine prenatal care at 37w3d.  Pregnancy ROS: no leakage of fluid, pelvic pain, or vaginal bleeding.  normal fetal movement.    The following portions of the patient's history were reviewed and updated as appropriate: allergies, current medications, past family history, past medical history, obstetric history, gynecologic history, past social history, past surgical history and problem list.      Objective:  /58   Wt 68.5 kg (151 lb)   LMP 2024 (Exact Date)   BMI 26.75 kg/m²   Pregravid Weight/BMI: 55.8 kg (123 lb) (BMI 21.79)  Current Weight: 68.5 kg (151 lb)   Total Weight Gain: 12.7 kg (28 lb)   Pre- Vitals      Flowsheet Row Most Recent Value   Prenatal Assessment    Fetal Heart Rate 145   Fundal Height (cm) 37 cm   Movement Present   Presentation Vertex   Prenatal Vitals    Blood Pressure 130/58   Weight - Scale 68.5 kg (151 lb)   Urine Albumin/Glucose    Dilation/Effacement/Station    Vaginal Drainage    Edema    LLE Edema None   RLE Edema None             General: Well appearing, no distress  Abdomen: Soft, gravid, nontender  Extremities: Non tender.

## 2024-12-31 PROBLEM — Z3A.38 38 WEEKS GESTATION OF PREGNANCY: Status: ACTIVE | Noted: 2024-10-03

## 2024-12-31 NOTE — PATIENT INSTRUCTIONS
- Please call office if leaking of fluid or bleeding.  - You may be in labor if you are having regular contractions for > 1 hour (lasting 1 minute, every 5 minutes, becoming more painful for over time, and do not decrease with rest and drinking 2 glasses of water or other fluid).  - Please call if you feel a significant decrease in fetal movement and during fetal kick counts the baby does not move 10 times in 2 hours. - Please call office if leaking of fluid or bleeding.  - You may be in labor if you are having regular contractions for > 1 hour (lasting 1 minute, every 5 minutes, becoming more painful for over time, and do not decrease with rest and drinking 2 glasses of water or other fluid).  - Please call if you feel a significant decrease in fetal movement and during fetal kick counts the baby does not move 10 times in 2 hours.

## 2025-01-02 ENCOUNTER — ROUTINE PRENATAL (OUTPATIENT)
Dept: OBGYN CLINIC | Facility: CLINIC | Age: 40
End: 2025-01-02
Payer: COMMERCIAL

## 2025-01-02 VITALS — DIASTOLIC BLOOD PRESSURE: 60 MMHG | BODY MASS INDEX: 26.5 KG/M2 | WEIGHT: 149.6 LBS | SYSTOLIC BLOOD PRESSURE: 112 MMHG

## 2025-01-02 DIAGNOSIS — O09.523 MULTIGRAVIDA OF ADVANCED MATERNAL AGE IN THIRD TRIMESTER: ICD-10-CM

## 2025-01-02 DIAGNOSIS — Z3A.38 38 WEEKS GESTATION OF PREGNANCY: ICD-10-CM

## 2025-01-02 DIAGNOSIS — O34.211 MATERNAL CARE DUE TO LOW TRANSVERSE UTERINE SCAR FROM PREVIOUS CESAREAN DELIVERY: Primary | ICD-10-CM

## 2025-01-02 DIAGNOSIS — Z30.2 REQUEST FOR STERILIZATION: ICD-10-CM

## 2025-01-02 LAB
SL AMB  POCT GLUCOSE, UA: NEGATIVE
SL AMB POCT URINE PROTEIN: NEGATIVE

## 2025-01-02 PROCEDURE — PNV: Performed by: OBSTETRICS & GYNECOLOGY

## 2025-01-02 PROCEDURE — 81002 URINALYSIS NONAUTO W/O SCOPE: CPT | Performed by: OBSTETRICS & GYNECOLOGY

## 2025-01-02 NOTE — PROGRESS NOTES
Routine Prenatal Visit  [facility] - [place]  [address], [address], [place] [address]    Assessment/Plan:  Naye is a 39 y.o. year old  at 38w3d who presents for routine prenatal visit.     Assessment & Plan  Maternal care due to low transverse uterine scar from previous  delivery  Repeat LTCS scheduled 2025 with bilateral salpingectomy.         Multigravida of advanced maternal age in third trimester    Request for sterilization      38 weeks gestation of pregnancy    Orders:    POCT urine dip          Subjective:     CC: Prenatal care    Naye Duran is a 39 y.o.  female who presents for routine prenatal care at 38w3d.  Pregnancy ROS: no leakage of fluid, pelvic pain, or vaginal bleeding.  normal fetal movement.    The following portions of the patient's history were reviewed and updated as appropriate: allergies, current medications, past family history, past medical history, obstetric history, gynecologic history, past social history, past surgical history and problem list.      Objective:  /60   Wt 67.9 kg (149 lb 9.6 oz)   LMP 2024 (Exact Date)   BMI 26.50 kg/m²   Pregravid Weight/BMI: 55.8 kg (123 lb) (BMI 21.79)  Current Weight: 67.9 kg (149 lb 9.6 oz)   Total Weight Gain: 12.1 kg (26 lb 9.6 oz)   Pre-Chetna Vitals      Flowsheet Row Most Recent Value   Prenatal Assessment    Fetal Heart Rate 140   Fundal Height (cm) 38 cm   Movement Present   Presentation Vertex   Prenatal Vitals    Blood Pressure 112/60   Weight - Scale 67.9 kg (149 lb 9.6 oz)   Urine Albumin/Glucose    Dilation/Effacement/Station    Vaginal Drainage    Edema    LLE Edema None   RLE Edema None             General: Well appearing, no distress  Abdomen: Soft, gravid, nontender  Extremities: Non tender.

## 2025-01-03 LAB
DME PARACHUTE DELIVERY DATE REQUESTED: NORMAL
DME PARACHUTE ITEM DESCRIPTION: NORMAL
DME PARACHUTE ORDER STATUS: NORMAL
DME PARACHUTE SUPPLIER NAME: NORMAL
DME PARACHUTE SUPPLIER PHONE: NORMAL

## 2025-01-04 PROBLEM — Z87.440 HISTORY OF UTI: Status: RESOLVED | Noted: 2024-12-05 | Resolved: 2025-01-04

## 2025-01-07 ENCOUNTER — ANESTHESIA EVENT (INPATIENT)
Dept: LABOR AND DELIVERY | Facility: HOSPITAL | Age: 40
End: 2025-01-07
Payer: COMMERCIAL

## 2025-01-07 PROBLEM — M75.02 ADHESIVE CAPSULITIS OF LEFT SHOULDER: Status: ACTIVE | Noted: 2025-01-07

## 2025-01-07 PROBLEM — M25.512 PAIN IN LEFT SHOULDER: Status: ACTIVE | Noted: 2025-01-07

## 2025-01-07 NOTE — ANESTHESIA PREPROCEDURE EVALUATION
Procedure:   SECTION () REPEAT (Uterus)  LIGATION/COAGULATION TUBAL (Bilateral: Abdomen)    Relevant Problems   GYN   (+) 38 weeks gestation of pregnancy   (+) Multigravida of advanced maternal age in third trimester      MUSCULOSKELETAL   (+) Adhesive capsulitis of left shoulder      Obstetrics/Gynecology   (+) Bacteriuria during pregnancy   (+) SAB (spontaneous )      Blood   (+) Rh negative state in antepartum period      Care Coordination   (+) Maternal care due to low transverse uterine scar from previous  delivery      Other   (+) Request for sterilization          Lab Results   Component Value Date    WBC 8.96 2025    HGB 10.6 (L) 2025    HCT 34.1 (L) 2025    MCV 81 (L) 2025     2025       Physical Exam    Airway    Mallampati score: II  TM Distance: >3 FB  Neck ROM: full     Dental       Cardiovascular      Pulmonary      Other Findings  post-pubertal.      Anesthesia Plan  ASA Score- 2     Anesthesia Type- spinal with ASA Monitors.         Additional Monitors:     Airway Plan:            Plan Factors-Exercise tolerance (METS): >4 METS.    Chart reviewed. EKG reviewed. Imaging results reviewed. Existing labs reviewed. Patient summary reviewed.                  Induction- intravenous.    Postoperative Plan-     Perioperative Resuscitation Plan - Level 1 - Full Code.       Informed Consent- Anesthetic plan and risks discussed with patient.  I personally reviewed this patient with the CRNA. Discussed and agreed on the Anesthesia Plan with the CRNA..

## 2025-01-08 ENCOUNTER — HOSPITAL ENCOUNTER (INPATIENT)
Facility: HOSPITAL | Age: 40
LOS: 2 days | Discharge: HOME | End: 2025-01-10
Attending: OBSTETRICS & GYNECOLOGY | Admitting: OBSTETRICS & GYNECOLOGY
Payer: COMMERCIAL

## 2025-01-08 ENCOUNTER — ANESTHESIA (INPATIENT)
Dept: LABOR AND DELIVERY | Facility: HOSPITAL | Age: 40
End: 2025-01-08
Payer: COMMERCIAL

## 2025-01-08 DIAGNOSIS — Z30.2 REQUEST FOR STERILIZATION: Primary | ICD-10-CM

## 2025-01-08 DIAGNOSIS — O09.523 MULTIGRAVIDA OF ADVANCED MATERNAL AGE IN THIRD TRIMESTER: ICD-10-CM

## 2025-01-08 DIAGNOSIS — Z3A.39 39 WEEKS GESTATION OF PREGNANCY: ICD-10-CM

## 2025-01-08 DIAGNOSIS — O34.211 MATERNAL CARE DUE TO LOW TRANSVERSE UTERINE SCAR FROM PREVIOUS CESAREAN DELIVERY: ICD-10-CM

## 2025-01-08 LAB
ABO GROUP BLD: NORMAL
ABO GROUP BLD: NORMAL
BASE EXCESS BLDCOA CALC-SCNC: -1.4 MMOL/L (ref 3–11)
BASE EXCESS BLDCOV CALC-SCNC: -1.4 MMOL/L (ref 1–9)
BLD GP AB SCN SERPL QL: NEGATIVE
BLD GP AB SCN SERPL QL: NEGATIVE
ERYTHROCYTE [DISTWIDTH] IN BLOOD BY AUTOMATED COUNT: 14.8 % (ref 11.6–15.1)
FETAL CELL SCN BLD QL ROSETTE: NEGATIVE
HCO3 BLDCOA-SCNC: 24.8 MMOL/L (ref 17.3–27.3)
HCO3 BLDCOV-SCNC: 23.5 MMOL/L (ref 12.2–28.6)
HCT VFR BLD AUTO: 34.1 % (ref 34.8–46.1)
HGB BLD-MCNC: 10.6 G/DL (ref 11.5–15.4)
HOLD SPECIMEN: NORMAL
MCH RBC QN AUTO: 25.3 PG (ref 26.8–34.3)
MCHC RBC AUTO-ENTMCNC: 31.1 G/DL (ref 31.4–37.4)
MCV RBC AUTO: 81 FL (ref 82–98)
O2 CT VFR BLDCOA CALC: 7.3 ML/DL
OXYHGB MFR BLDCOA: 34.9 %
OXYHGB MFR BLDCOV: 64.6 %
PCO2 BLDCOA: 47.2 MM[HG] (ref 30–60)
PCO2 BLDCOV: 40.1 MM HG (ref 27–43)
PH BLDCOA: 7.34 [PH] (ref 7.23–7.43)
PH BLDCOV: 7.38 [PH] (ref 7.19–7.49)
PLATELET # BLD AUTO: 274 THOUSANDS/UL (ref 149–390)
PMV BLD AUTO: 9.2 FL (ref 8.9–12.7)
PO2 BLDCOA: 16.9 MM HG (ref 5–25)
PO2 BLDCOV: 27.2 MM HG (ref 15–45)
RBC # BLD AUTO: 4.19 MILLION/UL (ref 3.81–5.12)
RH BLD: NEGATIVE
RH BLD: NEGATIVE
SAO2 % BLDCOV: 12.8 ML/DL
SPECIMEN EXPIRATION DATE: NORMAL
TREPONEMA PALLIDUM IGG+IGM AB [PRESENCE] IN SERUM OR PLASMA BY IMMUNOASSAY: NORMAL
WBC # BLD AUTO: 8.96 THOUSAND/UL (ref 4.31–10.16)

## 2025-01-08 PROCEDURE — 82805 BLOOD GASES W/O2 SATURATION: CPT | Performed by: OBSTETRICS & GYNECOLOGY

## 2025-01-08 PROCEDURE — 86780 TREPONEMA PALLIDUM: CPT | Performed by: OBSTETRICS & GYNECOLOGY

## 2025-01-08 PROCEDURE — 88302 TISSUE EXAM BY PATHOLOGIST: CPT | Performed by: STUDENT IN AN ORGANIZED HEALTH CARE EDUCATION/TRAINING PROGRAM

## 2025-01-08 PROCEDURE — 0UB70ZZ EXCISION OF BILATERAL FALLOPIAN TUBES, OPEN APPROACH: ICD-10-PCS | Performed by: OBSTETRICS & GYNECOLOGY

## 2025-01-08 PROCEDURE — 85461 HEMOGLOBIN FETAL: CPT | Performed by: OBSTETRICS & GYNECOLOGY

## 2025-01-08 PROCEDURE — NC001 PR NO CHARGE: Performed by: OBSTETRICS & GYNECOLOGY

## 2025-01-08 PROCEDURE — 94762 N-INVAS EAR/PLS OXIMTRY CONT: CPT

## 2025-01-08 PROCEDURE — 86901 BLOOD TYPING SEROLOGIC RH(D): CPT | Performed by: OBSTETRICS & GYNECOLOGY

## 2025-01-08 PROCEDURE — 58611 LIGATE OVIDUCT(S) ADD-ON: CPT | Performed by: OBSTETRICS & GYNECOLOGY

## 2025-01-08 PROCEDURE — 85027 COMPLETE CBC AUTOMATED: CPT | Performed by: OBSTETRICS & GYNECOLOGY

## 2025-01-08 PROCEDURE — 59510 CESAREAN DELIVERY: CPT | Performed by: OBSTETRICS & GYNECOLOGY

## 2025-01-08 PROCEDURE — 86900 BLOOD TYPING SEROLOGIC ABO: CPT | Performed by: OBSTETRICS & GYNECOLOGY

## 2025-01-08 PROCEDURE — 6A550ZT PHERESIS OF CORD BLOOD STEM CELLS, SINGLE: ICD-10-PCS | Performed by: OBSTETRICS & GYNECOLOGY

## 2025-01-08 PROCEDURE — 86850 RBC ANTIBODY SCREEN: CPT | Performed by: OBSTETRICS & GYNECOLOGY

## 2025-01-08 PROCEDURE — 4A1HXCZ MONITORING OF PRODUCTS OF CONCEPTION, CARDIAC RATE, EXTERNAL APPROACH: ICD-10-PCS | Performed by: OBSTETRICS & GYNECOLOGY

## 2025-01-08 RX ORDER — ONDANSETRON 2 MG/ML
4 INJECTION INTRAMUSCULAR; INTRAVENOUS EVERY 8 HOURS PRN
Status: DISCONTINUED | OUTPATIENT
Start: 2025-01-08 | End: 2025-01-08

## 2025-01-08 RX ORDER — CEFAZOLIN SODIUM 2 G/50ML
2000 SOLUTION INTRAVENOUS ONCE
Status: COMPLETED | OUTPATIENT
Start: 2025-01-08 | End: 2025-01-08

## 2025-01-08 RX ORDER — SODIUM CHLORIDE, SODIUM LACTATE, POTASSIUM CHLORIDE, CALCIUM CHLORIDE 600; 310; 30; 20 MG/100ML; MG/100ML; MG/100ML; MG/100ML
125 INJECTION, SOLUTION INTRAVENOUS CONTINUOUS
Status: DISCONTINUED | OUTPATIENT
Start: 2025-01-08 | End: 2025-01-10 | Stop reason: HOSPADM

## 2025-01-08 RX ORDER — OXYCODONE HYDROCHLORIDE 5 MG/1
5 TABLET ORAL EVERY 4 HOURS PRN
Refills: 0 | Status: DISCONTINUED | OUTPATIENT
Start: 2025-01-09 | End: 2025-01-10 | Stop reason: HOSPADM

## 2025-01-08 RX ORDER — HYDROMORPHONE HCL/PF 1 MG/ML
0.5 SYRINGE (ML) INJECTION EVERY 2 HOUR PRN
Refills: 0 | Status: ACTIVE | OUTPATIENT
Start: 2025-01-08 | End: 2025-01-09

## 2025-01-08 RX ORDER — ONDANSETRON 2 MG/ML
4 INJECTION INTRAMUSCULAR; INTRAVENOUS EVERY 6 HOURS PRN
Status: ACTIVE | OUTPATIENT
Start: 2025-01-08 | End: 2025-01-09

## 2025-01-08 RX ORDER — DOCUSATE SODIUM 100 MG/1
100 CAPSULE, LIQUID FILLED ORAL 2 TIMES DAILY
Status: DISCONTINUED | OUTPATIENT
Start: 2025-01-08 | End: 2025-01-10 | Stop reason: HOSPADM

## 2025-01-08 RX ORDER — FENTANYL CITRATE 50 UG/ML
INJECTION, SOLUTION INTRAMUSCULAR; INTRAVENOUS AS NEEDED
Status: DISCONTINUED | OUTPATIENT
Start: 2025-01-08 | End: 2025-01-08

## 2025-01-08 RX ORDER — NALBUPHINE HYDROCHLORIDE 10 MG/ML
5 INJECTION INTRAMUSCULAR; INTRAVENOUS; SUBCUTANEOUS
Status: ACTIVE | OUTPATIENT
Start: 2025-01-08 | End: 2025-01-09

## 2025-01-08 RX ORDER — NALOXONE HYDROCHLORIDE 0.4 MG/ML
0.1 INJECTION, SOLUTION INTRAMUSCULAR; INTRAVENOUS; SUBCUTANEOUS
Status: ACTIVE | OUTPATIENT
Start: 2025-01-08 | End: 2025-01-09

## 2025-01-08 RX ORDER — CITRIC ACID/SODIUM CITRATE 334-500MG
30 SOLUTION, ORAL ORAL ONCE
Status: COMPLETED | OUTPATIENT
Start: 2025-01-08 | End: 2025-01-08

## 2025-01-08 RX ORDER — MORPHINE SULFATE 0.5 MG/ML
INJECTION, SOLUTION EPIDURAL; INTRATHECAL; INTRAVENOUS AS NEEDED
Status: DISCONTINUED | OUTPATIENT
Start: 2025-01-08 | End: 2025-01-08

## 2025-01-08 RX ORDER — BENZOCAINE/MENTHOL 6 MG-10 MG
1 LOZENGE MUCOUS MEMBRANE DAILY PRN
Status: DISCONTINUED | OUTPATIENT
Start: 2025-01-08 | End: 2025-01-10 | Stop reason: HOSPADM

## 2025-01-08 RX ORDER — DIPHENHYDRAMINE HYDROCHLORIDE 50 MG/ML
25 INJECTION INTRAMUSCULAR; INTRAVENOUS EVERY 6 HOURS PRN
Status: DISCONTINUED | OUTPATIENT
Start: 2025-01-08 | End: 2025-01-10 | Stop reason: HOSPADM

## 2025-01-08 RX ORDER — ONDANSETRON 2 MG/ML
INJECTION INTRAMUSCULAR; INTRAVENOUS AS NEEDED
Status: DISCONTINUED | OUTPATIENT
Start: 2025-01-08 | End: 2025-01-08

## 2025-01-08 RX ORDER — OXYTOCIN/RINGER'S LACTATE 30/500 ML
PLASTIC BAG, INJECTION (ML) INTRAVENOUS CONTINUOUS PRN
Status: DISCONTINUED | OUTPATIENT
Start: 2025-01-08 | End: 2025-01-08

## 2025-01-08 RX ORDER — IBUPROFEN 600 MG/1
600 TABLET, FILM COATED ORAL EVERY 6 HOURS
Status: DISCONTINUED | OUTPATIENT
Start: 2025-01-10 | End: 2025-01-10 | Stop reason: HOSPADM

## 2025-01-08 RX ORDER — OXYTOCIN/RINGER'S LACTATE 30/500 ML
62.5 PLASTIC BAG, INJECTION (ML) INTRAVENOUS ONCE
Status: COMPLETED | OUTPATIENT
Start: 2025-01-08 | End: 2025-01-08

## 2025-01-08 RX ORDER — OXYCODONE HYDROCHLORIDE 5 MG/1
10 TABLET ORAL EVERY 4 HOURS PRN
Refills: 0 | Status: DISCONTINUED | OUTPATIENT
Start: 2025-01-09 | End: 2025-01-10 | Stop reason: HOSPADM

## 2025-01-08 RX ORDER — KETOROLAC TROMETHAMINE 30 MG/ML
30 INJECTION, SOLUTION INTRAMUSCULAR; INTRAVENOUS EVERY 6 HOURS SCHEDULED
Status: DISPENSED | OUTPATIENT
Start: 2025-01-08 | End: 2025-01-09

## 2025-01-08 RX ORDER — DEXAMETHASONE SODIUM PHOSPHATE 10 MG/ML
INJECTION, SOLUTION INTRAMUSCULAR; INTRAVENOUS AS NEEDED
Status: DISCONTINUED | OUTPATIENT
Start: 2025-01-08 | End: 2025-01-08

## 2025-01-08 RX ORDER — ACETAMINOPHEN 325 MG/1
650 TABLET ORAL EVERY 4 HOURS PRN
Status: DISCONTINUED | OUTPATIENT
Start: 2025-01-08 | End: 2025-01-10 | Stop reason: HOSPADM

## 2025-01-08 RX ORDER — CALCIUM CARBONATE 500 MG/1
1000 TABLET, CHEWABLE ORAL DAILY PRN
Status: DISCONTINUED | OUTPATIENT
Start: 2025-01-08 | End: 2025-01-10 | Stop reason: HOSPADM

## 2025-01-08 RX ORDER — MIDAZOLAM HYDROCHLORIDE 2 MG/2ML
INJECTION, SOLUTION INTRAMUSCULAR; INTRAVENOUS CONTINUOUS PRN
Status: DISCONTINUED | OUTPATIENT
Start: 2025-01-08 | End: 2025-01-08

## 2025-01-08 RX ORDER — KETOROLAC TROMETHAMINE 30 MG/ML
INJECTION, SOLUTION INTRAMUSCULAR; INTRAVENOUS AS NEEDED
Status: DISCONTINUED | OUTPATIENT
Start: 2025-01-08 | End: 2025-01-08

## 2025-01-08 RX ORDER — BUPIVACAINE HYDROCHLORIDE 7.5 MG/ML
INJECTION, SOLUTION INTRASPINAL AS NEEDED
Status: DISCONTINUED | OUTPATIENT
Start: 2025-01-08 | End: 2025-01-08

## 2025-01-08 RX ADMIN — ACETAMINOPHEN 650 MG: 325 TABLET, FILM COATED ORAL at 14:05

## 2025-01-08 RX ADMIN — SODIUM CITRATE AND CITRIC ACID MONOHYDRATE 30 ML: 500; 334 SOLUTION ORAL at 09:17

## 2025-01-08 RX ADMIN — FENTANYL CITRATE 50 MCG: 50 INJECTION, SOLUTION INTRAMUSCULAR; INTRAVENOUS at 11:22

## 2025-01-08 RX ADMIN — MORPHINE SULFATE 0.85 MG: 0.5 INJECTION, SOLUTION EPIDURAL; INTRATHECAL; INTRAVENOUS at 11:24

## 2025-01-08 RX ADMIN — FENTANYL CITRATE 15 MCG: 50 INJECTION, SOLUTION INTRAMUSCULAR; INTRAVENOUS at 10:57

## 2025-01-08 RX ADMIN — MORPHINE SULFATE 0.15 MG: 0.5 INJECTION EPIDURAL; INTRATHECAL; INTRAVENOUS at 10:57

## 2025-01-08 RX ADMIN — Medication 250 MILLI-UNITS/MIN: at 11:18

## 2025-01-08 RX ADMIN — FENTANYL CITRATE 35 MCG: 50 INJECTION, SOLUTION INTRAMUSCULAR; INTRAVENOUS at 11:21

## 2025-01-08 RX ADMIN — CEFAZOLIN SODIUM 2000 MG: 2 SOLUTION INTRAVENOUS at 10:52

## 2025-01-08 RX ADMIN — MORPHINE SULFATE 1 MG: 0.5 INJECTION, SOLUTION EPIDURAL; INTRATHECAL; INTRAVENOUS at 11:26

## 2025-01-08 RX ADMIN — DOCUSATE SODIUM 100 MG: 100 CAPSULE, LIQUID FILLED ORAL at 14:06

## 2025-01-08 RX ADMIN — SODIUM CHLORIDE, SODIUM LACTATE, POTASSIUM CHLORIDE, AND CALCIUM CHLORIDE 125 ML/HR: .6; .31; .03; .02 INJECTION, SOLUTION INTRAVENOUS at 08:34

## 2025-01-08 RX ADMIN — DEXAMETHASONE SODIUM PHOSPHATE 10 MG: 10 INJECTION, SOLUTION INTRAMUSCULAR; INTRAVENOUS at 11:11

## 2025-01-08 RX ADMIN — PHENYLEPHRINE HYDROCHLORIDE 20 MCG/MIN: 10 INJECTION, SOLUTION INTRAVENOUS at 10:58

## 2025-01-08 RX ADMIN — ONDANSETRON 4 MG: 2 INJECTION INTRAMUSCULAR; INTRAVENOUS at 11:05

## 2025-01-08 RX ADMIN — KETOROLAC TROMETHAMINE 30 MG: 30 INJECTION, SOLUTION INTRAMUSCULAR; INTRAVENOUS at 17:42

## 2025-01-08 RX ADMIN — BUPIVACAINE HYDROCHLORIDE IN DEXTROSE 1.6 ML: 7.5 INJECTION, SOLUTION SUBARACHNOID at 10:57

## 2025-01-08 RX ADMIN — KETOROLAC TROMETHAMINE 30 MG: 30 INJECTION, SOLUTION INTRAMUSCULAR; INTRAVENOUS at 11:22

## 2025-01-08 RX ADMIN — SODIUM CHLORIDE, SODIUM LACTATE, POTASSIUM CHLORIDE, AND CALCIUM CHLORIDE 125 ML/HR: .6; .31; .03; .02 INJECTION, SOLUTION INTRAVENOUS at 14:42

## 2025-01-08 RX ADMIN — OXYTOCIN 62.5 MILLI-UNITS/MIN: 10 INJECTION INTRAVENOUS at 12:34

## 2025-01-08 RX ADMIN — SODIUM CHLORIDE, SODIUM LACTATE, POTASSIUM CHLORIDE, AND CALCIUM CHLORIDE 1000 ML: .6; .31; .03; .02 INJECTION, SOLUTION INTRAVENOUS at 07:38

## 2025-01-08 NOTE — PLAN OF CARE
Problem: PAIN - ADULT  Goal: Verbalizes/displays adequate comfort level or baseline comfort level  Description: Interventions:  - Encourage patient to monitor pain and request assistance  - Assess pain using appropriate pain scale  - Administer analgesics based on type and severity of pain and evaluate response  - Implement non-pharmacological measures as appropriate and evaluate response  - Consider cultural and social influences on pain and pain management  - Notify physician/advanced practitioner if interventions unsuccessful or patient reports new pain  1/8/2025 0847 by Elizabeth Lyle RN  Outcome: Progressing  1/8/2025 0847 by Elizabeth Lyle RN  Outcome: Progressing     Problem: INFECTION - ADULT  Goal: Absence or prevention of progression during hospitalization  Description: INTERVENTIONS:  - Assess and monitor for signs and symptoms of infection  - Monitor lab/diagnostic results  - Monitor all insertion sites, i.e. indwelling lines, tubes, and drains  - Monitor endotracheal if appropriate and nasal secretions for changes in amount and color  - Kennedy appropriate cooling/warming therapies per order  - Administer medications as ordered  - Instruct and encourage patient and family to use good hand hygiene technique  - Identify and instruct in appropriate isolation precautions for identified infection/condition  1/8/2025 0847 by Elizabeth Lyle RN  Outcome: Progressing  1/8/2025 0847 by Elizabeth Lyle RN  Outcome: Progressing  Goal: Absence of fever/infection during neutropenic period  Description: INTERVENTIONS:  - Monitor WBC    1/8/2025 0847 by Elizabeth Lyle RN  Outcome: Progressing  1/8/2025 0847 by Elizabeth Lyle RN  Outcome: Progressing     Problem: SAFETY ADULT  Goal: Patient will remain free of falls  Description: INTERVENTIONS:  - Educate patient/family on patient safety including physical limitations  - Instruct patient to call for assistance with activity   - Consult OT/PT to assist with strengthening/mobility    - Keep Call bell within reach  - Keep bed low and locked with side rails adjusted as appropriate  - Keep care items and personal belongings within reach  - Initiate and maintain comfort rounds  - Make Fall Risk Sign visible to staff    - Apply yellow socks and bracelet for high fall risk patients  - Consider moving patient to room near nurses station  1/8/2025 0847 by Elizabeth Lyle RN  Outcome: Progressing  1/8/2025 0847 by Elizabeth Lyle RN  Outcome: Progressing  Goal: Maintain or return to baseline ADL function  Description: INTERVENTIONS:  -  Assess patient's ability to carry out ADLs; assess patient's baseline for ADL function and identify physical deficits which impact ability to perform ADLs (bathing, care of mouth/teeth, toileting, grooming, dressing, etc.)  - Assess/evaluate cause of self-care deficits   - Assess range of motion  - Assess patient's mobility; develop plan if impaired  - Assess patient's need for assistive devices and provide as appropriate  - Encourage maximum independence but intervene and supervise when necessary  - Involve family in performance of ADLs  - Assess for home care needs following discharge   - Consider OT consult to assist with ADL evaluation and planning for discharge  - Provide patient education as appropriate  1/8/2025 0847 by Elizabeth Lyle RN  Outcome: Progressing  1/8/2025 0847 by Elizabeth Lyle RN  Outcome: Progressing  Goal: Maintains/Returns to pre admission functional level  Description: INTERVENTIONS:  - Perform AM-PAC 6 Click Basic Mobility/ Daily Activity assessment daily.  - Set and communicate daily mobility goal to care team and patient/family/caregiver.   - Collaborate with rehabilitation services on mobility goals if consulted    - Out of bed for toileting  - Record patient progress and toleration of activity level   1/8/2025 0847 by Elizabeth Lyle RN  Outcome: Progressing  1/8/2025 0847 by Elizabeth Lyle RN  Outcome: Progressing     Problem: Knowledge Deficit  Goal:  Patient/family/caregiver demonstrates understanding of disease process, treatment plan, medications, and discharge instructions  Description: Complete learning assessment and assess knowledge base.  Interventions:  - Provide teaching at level of understanding  - Provide teaching via preferred learning methods  2025 by Elizabeth Lyle RN  Outcome: Progressing  2025 by Elizabeth Lyle RN  Outcome: Progressing     Problem: DISCHARGE PLANNING  Goal: Discharge to home or other facility with appropriate resources  Description: INTERVENTIONS:  - Identify barriers to discharge w/patient and caregiver  - Arrange for needed discharge resources and transportation as appropriate  - Identify discharge learning needs (meds, wound care, etc.)  - Arrange for interpretive services to assist at discharge as needed  - Refer to Case Management Department for coordinating discharge planning if the patient needs post-hospital services based on physician/advanced practitioner order or complex needs related to functional status, cognitive ability, or social support system  2025 by Elizabeth Lyle RN  Outcome: Progressing  2025 by Elizabeth Lyle RN  Outcome: Progressing     Problem: BIRTH - VAGINAL/ SECTION  Goal: Fetal and maternal status remain reassuring during the birth process  Description: INTERVENTIONS:  - Monitor vital signs  - Monitor fetal heart rate  - Monitor uterine activity  - Monitor labor progression (vaginal delivery)  - DVT prophylaxis  - Antibiotic prophylaxis  Outcome: Progressing  Goal: Emotionally satisfying birthing experience for mother/fetus  Description: Interventions:  - Assess, plan, implement and evaluate the nursing care given to the patient in labor  - Advocate the philosophy that each childbirth experience is a unique experience and support the family's chosen level of involvement and control during the labor process   - Actively participate in both the patient's and family's  teaching of the birth process  - Consider cultural, Alevism and age-specific factors and plan care for the patient in labor  Outcome: Progressing     Problem: POSTPARTUM  Goal: Experiences normal postpartum course  Description: INTERVENTIONS:  - Monitor maternal vital signs  - Assess uterine involution and lochia  Outcome: Progressing  Goal: Appropriate maternal -  bonding  Description: INTERVENTIONS:  - Identify family support  - Assess for appropriate maternal/infant bonding   -Encourage maternal/infant bonding opportunities  - Referral to  or  as needed  Outcome: Progressing  Goal: Establishment of infant feeding pattern  Description: INTERVENTIONS:  - Assess breast/bottle feeding  - Refer to lactation as needed  Outcome: Progressing  Goal: Incision(s), wounds(s) or drain site(s) healing without S/S of infection  Description: INTERVENTIONS  - Assess and document dressing, incision, wound bed, drain sites and surrounding tissue  - Provide patient and family education  Outcome: Progressing

## 2025-01-08 NOTE — ANESTHESIA PROCEDURE NOTES
Spinal Block    Patient location during procedure: OR  Start time: 1/8/2025 10:57 AM  Reason for block: at surgeon's request, post-op pain management and primary anesthetic  Staffing  Performed by: Sana Sequeira CRNA  Authorized by: Rogelio Scott MD    Preanesthetic Checklist  Completed: patient identified, IV checked, site marked, risks and benefits discussed, surgical consent, monitors and equipment checked, pre-op evaluation and timeout performed  Spinal Block  Patient position: sitting  Prep: ChloraPrep and site prepped and draped  Patient monitoring: frequent blood pressure checks, continuous pulse ox and heart rate  Approach: midline  Location: L3-4  Needle  Needle type: Pencan   Needle gauge: 24 G  Needle length: 4 in  Assessment  Sensory level: T4  Injection Assessment:  negative aspiration for heme, no paresthesia on injection and positive aspiration for clear CSF.  Post-procedure:  site cleaned

## 2025-01-08 NOTE — ANESTHESIA POSTPROCEDURE EVALUATION
Post-Op Assessment Note    CV Status:  Stable  Pain Score: 0    Pain management: adequate       Mental Status:  Alert and awake   Hydration Status:  Euvolemic and stable   PONV Controlled:  None   Airway Patency:  Patent     Post Op Vitals Reviewed: Yes    No anethesia notable event occurred.    Staff: CRNA       Last Filed PACU Vitals:  Vitals Value Taken Time   Temp 97.7 °F (36.5 °C) 01/08/25 1205   Pulse 92 01/08/25 1205   BP 80/61 01/08/25 1205   Resp 19 01/08/25 1205   SpO2 95 % 01/08/25 1205

## 2025-01-08 NOTE — OP NOTE
OPERATIVE REPORT  PATIENT NAME: Naye Duran    :  1985  MRN: [id number]  Pt Location:  L&D OR ROOM 02    SURGERY DATE: 2025    Surgeons and Role:     * Madison Carranza DO - Primary     * Kasia Billingsley,  - Assisting    No qualified surgical resident was available to assist in the case. The assistance of an additional surgeon was critical for completion of the case. The assistant surgeon provided assistance with exposure, hemostasis, delivery of the infant, tissue manipulation, and suturing. The assistant surgeon was present from the start of the procedure until fascial closure. I, the primary surgeon, was present and scrubbed throughout the entirety of the case and performed all key portions of the surgical procedure.       Preop Diagnosis:  1. Guo pregnancy in vertex presentation at 39w2d gestation for repeat  section and b/l salpingectomy    Post Operative Diagnosis:   1. Same    Procedure(s) (LRB):   SECTION () REPEAT (N/A)  LIGATION/COAGULATION TUBAL (Bilateral)    Specimen(s):  ID Type Source Tests Collected by Time Destination   1 :  Tissue Fallopian Tube, Left TISSUE EXAM Madison Carranza, DO 2025 1125    2 :  Tissue Fallopian Tube, Right TISSUE EXAM Madison Carranza,  2025 1127    A :  Cord Blood Cord BLOOD GAS, VENOUS, CORD, BLOOD GAS, ARTERIAL, CORD Madison Carranza, DO 2025 1117    B :  Tissue (Placenta on Hold) OB Only Placenta PLACENTA IN STORAGE Madison Carranza, DO 2025 1120        Drains:  Urethral Catheter Non-latex 16 Fr. (Active)   Goal for Removal Remove POD#1 25 1108   Site Assessment Clean;Skin intact 25 1108   Tang Care Done 25 1108   Collection Container Standard drainage bag 25 1108   Number of days: 0     Anesthesia: Spinal    UOP: Clear via tang catheter    EBL: 399  mL    Complications: none    Operative Indications:  Naye Duran is a 39 y.o.  at 39w2d for repeat  section and  b/l salpingectomy.  All risks, benefits, and alternatives were discussed with the patient. All questions were answered. The patient agreed to proceed with surgery.     Olga Group Classification System:  No Multiple pregnancy, No Transverse or oblique lie, No Breech lie, Gestational age is > or =37 weeks, Multiparous, Previous uterine scar +  is OLGA GROUP 5    Operative Findings:  1. Live female infant delivered from vertex position through clear fluid at 1132, weight 3890 grams, Apgar scores of  9 and 9 at 1 and 5 minutes, respectively.  No nuchal cord.  2. Intact placenta delivered via fundal massage, 3-vessel cord noted.  3. Normal appearing bilateral fallopian tubes and ovaries.    Complications:   None    Procedure and Technique:  The patient was taken to the operating room, where she was placed under spinal anesthesia. The patient was then placed in supine position with a leftward tilt. She was prepped and draped in the normal sterile fashion. When anesthesia was found to be adequate, a Pfannenstiel skin incision was made with a scalpel and carried down to the underlying layer of fascia. The fascia was then incised with a scalpel and extended laterally and superiorly with curved Lomeli scissors. The superior portion of the fascial incision was then grasped with two Kocher clamps, elevated, and  from the underlying rectus muscles with sharp and blunt dissection. Attention was then turned to the inferior aspect of the fascial incision, which in a similar manner, was grasped with two Kocher clamps, elevated, and  from the underlying rectus muscles using sharp and blunt dissection. The rectus muscles were then  in the midline, and the peritoneum was entered bluntly. The peritoneal incision was then extended superiorly, inferiorly, and laterally using blunt dissection and electrocautery with excellent visualization of the bladder. When adequate exposure had been achieved, the  bladder blade was then placed. The vesicouterine peritoneum was identified and tented upwards with smooth pickups. The vesicouterine peritoneum as entered sharply with Metzenbaum scissors and the incision extended laterally and superiorly with the Metzenbaum scissors. A bladder flap was then created digitally. The bladder blade was then replaced.     A low transverse uterine incision was then made with a scalpel. The uterus was entered bluntly and the hysterotomy was extended bluntly in a cephalad-caudad manner. Amniotomy was performed . The infant was then delivered in  presentation. After delayed cord clamping for 30 seconds, the cord was clamped and cut, and the infant was handed off to awaiting Pediatricians. Cord blood was collected and the placenta was delivered with fundal massage noted to be intact with 3-vessel cord.     The uterus was then exteriorized and cleared of all clots and debris. The hysterotomy was then closed with 0-Vicryl suture in a running locked fashion. A second layer of the same suture was used in a imbricating fashion in order to obtain excellent hemostasis.     Attention was then turned to the left adnexa, where the fallopian tube was elevated with two Victor Manuel clamps. Windows were created in the underlying mesosalpinx using Bovie, with the intervening vascular pedicles ligated using 2-0 plain gut suture and subsequently divided using Bovie. The fallopian tube was transected at the cornua, removed, and sent to pathology. In identical fashion, the right fallopian tube was elevated, windows created, vascular pedicles ligated, and fallopian tube transected, removed, and sent to pathology. Pedicles were closely inspected and noted to be hemostatic bilaterally.     The uterus was then returned to the abdomen, and the gutters were cleared of all clots and debris. The hysterotomy was inspected and noted to be hemostatic. The fascia was then closed with 0 Vicryl suture in a running fashion. The  skin was then closed with Insorb staples in subcuticular fashion.     The patient tolerated the procedure well. Sponge, lap, and needle counts were correct x 3. The patient was taken to the Recovery Room in stable condition.             I was present for the entire procedure.    Patient Disposition:  PACU     SIGNATURE: Madison Carranza DO  DATE: January 8, 2025  TIME: 12:22 PM

## 2025-01-08 NOTE — H&P
History & Physical - OB/GYN   Naye Duran 39 y.o. female MRN: [id number]  Unit/Bed#: LD PACU-03 Encounter: [id number]    Assessment:   39 y.o.  at 39w2d for scheduled repeat  section and bilateral salpingectomy.    Plan:   1. Admit to L&D  2. Place IV and IV fluids  3. Labs: CBC, RPR, type and screen  4. Continue NPO  5. Fetal monitoring and toco  6. Anesthesia evaluation  7. Preop Antibiotics ordered       ________________________________________________________  39 y.o. yo  at 39w2d with MARLIN of 2025, by Last Menstrual Period.    She is a patient of [facility] [place].    Chief complaint:  Scheduled  and b/l salpingectomy    HPI:  39 y.o. yo  at 39w2d with Estimated Date of Delivery: 25 admitted for scheduled repeat  section and b/l salpingectomy      Pregnancy Complications:   Problems (from 24 to present)       Problem Noted Diagnosed Resolved    38 weeks gestation of pregnancy 10/3/2024 by BUDDY Huerta  No    Overview Addendum 2024  9:45 AM by Lulu Nelson MD   10/24/2024 - counseling done on flu shot,  tdap and rsv.  Declines      GDM testing: Naye sent screen shot of CGM results from 10/20/2024 to 10/30/2024.  Fasting appears <90 and rarely is any value > 120 even after meals.  In Media section of chart.         Request for sterilization 2024 by Madison Carranza DO  No    Overview Signed 2024 10:18 AM by Madison Carranza DO   Desires bilateral salpingectomy at time of repeat C/S         Rh negative state in antepartum period 2024 by Latia Hamilton PA-C  No    Overview Addendum 2024  7:55 PM by Lulu Nelson MD   O negative, will plan Rhogam at 28 weeks.   Rhogam at 10/24/2024         Multigravida of advanced maternal age in third trimester 2024 by Perico Cuevas MD  No    Maternal care due to low transverse uterine scar from previous  delivery 2024 by Perico  MD Mason  No    Overview Signed 2024 12:33 PM by Perico Cuevas MD   C/S x 2. For repeat.                   PMH:  Past Medical History:   Diagnosis Date    Migraine     2x/yr    Miscarriage 22    Varicella        PSH:  Past Surgical History:   Procedure Laterality Date     SECTION  14; 7/30/15       Social Hx:  Social History     Tobacco Use    Smoking status: Former     Current packs/day: 0.00     Types: Cigarettes     Quit date: 2013     Years since quittin.6    Smokeless tobacco: Never   Vaping Use    Vaping status: Never Used   Substance Use Topics    Alcohol use: Not Currently     Comment: occas    Drug use: Never        OB Hx:  OB History    Para Term  AB Living   4 2 2 0 1 2   SAB IAB Ectopic Multiple Live Births   1 0 0 0 2      # Outcome Date GA Lbr Miguel/2nd Weight Sex Type Anes PTL Lv   4 Current            3 SAB 22 6w0d          2 Term 07/30/15 39w0d  3771 g (8 lb 5 oz) F CS-LTranv Spinal  JYOTHI   1 Term 14 41w0d  3856 g (8 lb 8 oz) F CS-LTranv EPI N JYOTHI      Birth Comments: fully dilated & pushing      Complications: Fetal Intolerance       Meds:  No current facility-administered medications on file prior to encounter.     Current Outpatient Medications on File Prior to Encounter   Medication Sig Dispense Refill    Ascorbic Acid (vitamin C) 1000 MG tablet Take 1,000 mg by mouth daily      b complex vitamins capsule Take 1 capsule by mouth daily      Desiccated Beef Liver POWD Use      magnesium citrate solution Take 296 mL by mouth if needed      Vitamin D-Vitamin K (VITAMIN K2-VITAMIN D3 PO) Take by mouth         Allergies:  Allergies   Allergen Reactions    Penicillins Rash       Labs:  ABO Grouping   Date Value Ref Range Status   10/22/2024 O  Final      Rh Type   Date Value Ref Range Status   10/22/2024 Negative  Final     Comment:     Please note: Prior records for this patient's ABO / Rh type are not  available for additional verification.    "     Rh Type   Date Value Ref Range Status   10/22/2024 Negative  Final     Comment:     Please note: Prior records for this patient's ABO / Rh type are not  available for additional verification.       HIV-1/HIV-2 AB   Date Value Ref Range Status   2024 Non-Reactive  Final     Hepatitis B Surface Ag   Date Value Ref Range Status   2024 negative  Final     HEP C AB   Date Value Ref Range Status   2024 Non Reactive Non Reactive Final     RPR   Date Value Ref Range Status   10/22/2024 Non Reactive Non Reactive Final     External Rubella IGG Quantitation   Date Value Ref Range Status   2024 immune  Final     No results found for: \"IKJ4ISPV09NA\"  No results found for: \"NEWYWQI0RL\"  Strep Grp B NOREEN   Date Value Ref Range Status   2024 Negative Negative Final     Comment:     Centers for Disease Control and Prevention (CDC) and American Congress  of Obstetricians and Gynecologists (ACOG) guidelines for prevention of   group B streptococcal (GBS) disease specify co-collection of  a vaginal and rectal swab specimen to maximize sensitivity of GBS  detection. Per the CDC and ACOG, swabbing both the lower vagina and  rectum substantially increases the yield of detection compared with  sampling the vagina alone.  Penicillin G, ampicillin, or cefazolin are indicated for intrapartum  prophylaxis of  GBS colonization. Reflex susceptibility  testing should be performed prior to use of clindamycin only on GBS  isolates from penicillin-allergic women who are considered a high risk  for anaphylaxis. Treatment with vancomycin without additional testing  is warranted if resistance to clindamycin is noted.         Physical Exam:  /71 (BP Location: Left arm)   Pulse 100   Temp 98.1 °F (36.7 °C) (Oral)   Resp 20   Ht 5' 3\" (1.6 m)   Wt 67.6 kg (149 lb)   LMP 2024 (Exact Date)   SpO2 99%   BMI 26.39 kg/m²     Gen: alert, no acute distress  Cardiac: regular rate  Resp: " unlabored breathing  Abdomen: gravid, non-tender, non-distended  Extremities: non-tender, no edema    Estimated Fetal Weight: 8 lbs  Presentation: cephalic    SVE:      FHT:  Baseline Rate (FHR): 145 bpm  Variability: Minimal  Accelerations: 15 x 15 or greater  Decelerations: None  FHR Category: Category I    TOCO:  no CTX     Membranes: intact         Madison Carranza,   1/8/2025 8:22 AM

## 2025-01-09 LAB
ERYTHROCYTE [DISTWIDTH] IN BLOOD BY AUTOMATED COUNT: 15 % (ref 11.6–15.1)
HCT VFR BLD AUTO: 33.7 % (ref 34.8–46.1)
HGB BLD-MCNC: 10.4 G/DL (ref 11.5–15.4)
MCH RBC QN AUTO: 25.6 PG (ref 26.8–34.3)
MCHC RBC AUTO-ENTMCNC: 30.9 G/DL (ref 31.4–37.4)
MCV RBC AUTO: 83 FL (ref 82–98)
PLATELET # BLD AUTO: 265 THOUSANDS/UL (ref 149–390)
PMV BLD AUTO: 9.4 FL (ref 8.9–12.7)
RBC # BLD AUTO: 4.06 MILLION/UL (ref 3.81–5.12)
WBC # BLD AUTO: 19.47 THOUSAND/UL (ref 4.31–10.16)

## 2025-01-09 PROCEDURE — 99024 POSTOP FOLLOW-UP VISIT: CPT | Performed by: OBSTETRICS & GYNECOLOGY

## 2025-01-09 PROCEDURE — 85027 COMPLETE CBC AUTOMATED: CPT | Performed by: OBSTETRICS & GYNECOLOGY

## 2025-01-09 RX ADMIN — KETOROLAC TROMETHAMINE 30 MG: 30 INJECTION, SOLUTION INTRAMUSCULAR; INTRAVENOUS at 06:24

## 2025-01-09 RX ADMIN — KETOROLAC TROMETHAMINE 30 MG: 30 INJECTION, SOLUTION INTRAMUSCULAR; INTRAVENOUS at 11:32

## 2025-01-09 RX ADMIN — RHO(D) IMMUNE GLOBULIN (HUMAN) 300 MCG: 1500 SOLUTION INTRAMUSCULAR at 08:37

## 2025-01-09 RX ADMIN — DOCUSATE SODIUM 100 MG: 100 CAPSULE, LIQUID FILLED ORAL at 08:36

## 2025-01-09 RX ADMIN — DOCUSATE SODIUM 100 MG: 100 CAPSULE, LIQUID FILLED ORAL at 17:33

## 2025-01-09 RX ADMIN — ACETAMINOPHEN 650 MG: 325 TABLET, FILM COATED ORAL at 08:36

## 2025-01-09 RX ADMIN — KETOROLAC TROMETHAMINE 30 MG: 30 INJECTION, SOLUTION INTRAMUSCULAR; INTRAVENOUS at 00:39

## 2025-01-09 RX ADMIN — KETOROLAC TROMETHAMINE 30 MG: 30 INJECTION, SOLUTION INTRAMUSCULAR; INTRAVENOUS at 17:33

## 2025-01-09 NOTE — PLAN OF CARE
Problem: PAIN - ADULT  Goal: Verbalizes/displays adequate comfort level or baseline comfort level  Description: Interventions:  - Encourage patient to monitor pain and request assistance  - Assess pain using appropriate pain scale  - Administer analgesics based on type and severity of pain and evaluate response  - Implement non-pharmacological measures as appropriate and evaluate response  - Consider cultural and social influences on pain and pain management  - Notify physician/advanced practitioner if interventions unsuccessful or patient reports new pain  Outcome: Progressing     Problem: INFECTION - ADULT  Goal: Absence or prevention of progression during hospitalization  Description: INTERVENTIONS:  - Assess and monitor for signs and symptoms of infection  - Monitor lab/diagnostic results  - Monitor all insertion sites, i.e. indwelling lines, tubes, and drains  - Monitor endotracheal if appropriate and nasal secretions for changes in amount and color  - [place] appropriate cooling/warming therapies per order  - Administer medications as ordered  - Instruct and encourage patient and family to use good hand hygiene technique  - Identify and instruct in appropriate isolation precautions for identified infection/condition  Outcome: Progressing  Goal: Absence of fever/infection during neutropenic period  Description: INTERVENTIONS:  - Monitor WBC    Outcome: Progressing     Problem: SAFETY ADULT  Goal: Patient will remain free of falls  Description: INTERVENTIONS:  - Educate patient/family on patient safety including physical limitations  - Instruct patient to call for assistance with activity   - Consult OT/PT to assist with strengthening/mobility   - Keep Call bell within reach  - Keep bed low and locked with side rails adjusted as appropriate  - Keep care items and personal belongings within reach  - Initiate and maintain comfort rounds  - Make Fall Risk Sign visible to staff  - Apply yellow socks and bracelet  for high fall risk patients  - Consider moving patient to room near nurses station  Outcome: Progressing  Goal: Maintain or return to baseline ADL function  Description: INTERVENTIONS:  -  Assess patient's ability to carry out ADLs; assess patient's baseline for ADL function and identify physical deficits which impact ability to perform ADLs (bathing, care of mouth/teeth, toileting, grooming, dressing, etc.)  - Assess/evaluate cause of self-care deficits   - Assess range of motion  - Assess patient's mobility; develop plan if impaired  - Assess patient's need for assistive devices and provide as appropriate  - Encourage maximum independence but intervene and supervise when necessary  - Involve family in performance of ADLs  - Assess for home care needs following discharge   - Consider OT consult to assist with ADL evaluation and planning for discharge  - Provide patient education as appropriate  Outcome: Progressing    Problem: POSTPARTUM  Goal: Experiences normal postpartum course  Description: INTERVENTIONS:  - Monitor maternal vital signs  - Assess uterine involution and lochia  Outcome: Progressing  Goal: Appropriate maternal -  bonding  Description: INTERVENTIONS:  - Identify family support  - Assess for appropriate maternal/infant bonding   -Encourage maternal/infant bonding opportunities  - Referral to  or  as needed  Outcome: Progressing  Goal: Establishment of infant feeding pattern  Description: INTERVENTIONS:  - Assess breast/bottle feeding  - Refer to lactation as needed  Outcome: Progressing  Goal: Incision(s), wounds(s) or drain site(s) healing without S/S of infection  Description: INTERVENTIONS  - Assess and document dressing, incision, wound bed, drain sites and surrounding tissue  - Provide patient and family education

## 2025-01-09 NOTE — PROGRESS NOTES
Progress Note - OB/GYN   Name: Naye Duran 39 y.o. female I MRN: [id number]  Unit/Bed#: -01 I Date of Admission: 2025   Date of Service: 2025 I Hospital Day: 1    Assessment & Plan  39 weeks gestation of pregnancy    Multigravida of advanced maternal age in third trimester    Maternal care due to low transverse uterine scar from previous  delivery    Rh negative state in antepartum period  Baby O+, Rhogam given  Request for sterilization  S/p bilateral salpingectomy  Delivery of pregnancy by  section  Doing well  Continue postop/postpartum care    OB Post-Partum Progress Note  Subjective   Post delivery. Patient is doing well. Lochia WNL. Pain well controlled.     Pain: yes, cramping, improved with meds  Tolerating PO: yes  Voiding: yes  Ambulating: yes  Breastfeeding:  yes  Chest pain: no  Shortness of breath: no  Leg pain: no  Lochia: WNL    Objective :  Temp:  [97.3 °F (36.3 °C)-98.5 °F (36.9 °C)] 97.9 °F (36.6 °C)  HR:  [75-99] 83  BP: ()/(56-77) 117/59  Resp:  [17-19] 17  SpO2:  [94 %-100 %] 100 %  O2 Device: None (Room air)    Physical Exam  Vitals and nursing note reviewed.   Constitutional:       General: She is not in acute distress.     Appearance: She is well-developed.   HENT:      Head: Normocephalic and atraumatic.   Eyes:      Conjunctiva/sclera: Conjunctivae normal.   Pulmonary:      Effort: Pulmonary effort is normal. No respiratory distress.   Abdominal:      Palpations: Abdomen is soft.      Tenderness: There is no abdominal tenderness.   Musculoskeletal:         General: No swelling.      Cervical back: Neck supple.   Skin:     General: Skin is warm and dry.      Capillary Refill: Capillary refill takes less than 2 seconds.   Neurological:      Mental Status: She is alert.   Psychiatric:         Mood and Affect: Mood normal.         Lab Results: I have reviewed the following results:  Lab Results   Component Value Date    WBC 19.47 (H) 2025    HGB  10.4 (L) 01/09/2025    HCT 33.7 (L) 01/09/2025    MCV 83 01/09/2025     01/09/2025

## 2025-01-09 NOTE — LACTATION NOTE
This note was copied from a baby's chart.  CONSULT - LACTATION  Baby Girl Duran (Lindsey) 1 days female MRN: [id number]    [facility] NURSERY Room / Bed: (N)/(N) Encounter: [id number]    Maternal Information     MOTHER:  Naye Duran  Maternal Age: 39 y.o.  OB History: # 1 - Date: 14, Sex: Female, Weight: 3856 g (8 lb 8 oz), GA: 41w0d, Type: , Low Transverse, Apgar1: None, Apgar5: None, Living: Living, Birth Comments: fully dilated & pushing    # 2 - Date: 07/30/15, Sex: Female, Weight: 3771 g (8 lb 5 oz), GA: 39w0d, Type: , Low Transverse, Apgar1: None, Apgar5: None, Living: Living, Birth Comments: None    # 3 - Date: 22, Sex: None, Weight: None, GA: 6w0d, Type: None, Apgar1: None, Apgar5: None, Living: None, Birth Comments: None    # 4 - Date: 25, Sex: Female, Weight: 3890 g (8 lb 9.2 oz), GA: 39w2d, Type: , Low Transverse, Apgar1: 9, Apgar5: 9, Living: Living, Birth Comments: None   Previouse breast reduction surgery? No    Lactation history:   Has patient previously breast fed: Yes   How long had patient previously breast fed: 10 yo - 6 months and 8 yo 15 months   Previous breast feeding complications: None     Past Surgical History:   Procedure Laterality Date     SECTION  14; 7/30/15       Birth information:  YOB: 2025   Time of birth: 11:32 AM   Sex: female   Delivery type: , Low Transverse   Birth Weight: 3890 g (8 lb 9.2 oz)   Percent of Weight Change: -3%     Gestational Age: 39w2d   [unfilled]    Assessment     Breast and nipple assessment: normal assessment    Cyrus Assessment: normal assessment    Feeding assessment: feeding well  LATCH:  Latch: Repeated attempts, hold nipple in mouth, stimulate to suck   Audible Swallowing: Spontaneous and intermittent (24 hours old)   Type of Nipple: Everted (After stimulation)   Comfort (Breast/Nipple): Soft/non-tender   Hold  (Positioning): No assist from staff, mother able to position/hold infant   LATCH Score: 9          Feeding recommendations:  breast feed on demand    Initial Consult: Met with Naye who is an experienced breastfeeding mom. (last baby was 9 years ago) Ready Set Baby and Discharge Breastfeding Booklets provided and refreshed some of the information with mom. Baby is latching well.     Positioning and Latch reviewed:  Position baby up at chest level using pillows for support .   Baby's ear, shoulder, hip in alignment.  Bring baby to breast,not breast to baby ( no hunching over ).  Align nose to nipple and drag nipple down to chin to achieve a wide open mouth.   Use breast compressions to stimulate suck.  Initial latch on pain should last less then a minute (Baby's cheeks should not be puckered and you should not hear any clicking sounds).  Offer baby both breasts at feedings.    Discussed with Naye how to utilize feeding log & monitor baby's output.     Naye has no questions at this time, encouraged her to call with additional questions or for breastfeeding support as needed.        Autumn Perez RN 1/9/2025 11:12 AM

## 2025-01-09 NOTE — PLAN OF CARE
Problem: PAIN - ADULT  Goal: Verbalizes/displays adequate comfort level or baseline comfort level  Description: Interventions:  - Encourage patient to monitor pain and request assistance  - Assess pain using appropriate pain scale  - Administer analgesics based on type and severity of pain and evaluate response  - Implement non-pharmacological measures as appropriate and evaluate response  - Consider cultural and social influences on pain and pain management  - Notify physician/advanced practitioner if interventions unsuccessful or patient reports new pain  1/9/2025 0029 by Cornelia Barron RN  Outcome: Progressing  1/9/2025 0029 by Cornelia Barron RN  Outcome: Progressing     Problem: INFECTION - ADULT  Goal: Absence or prevention of progression during hospitalization  Description: INTERVENTIONS:  - Assess and monitor for signs and symptoms of infection  - Monitor lab/diagnostic results  - Monitor all insertion sites, i.e. indwelling lines, tubes, and drains  - Monitor endotracheal if appropriate and nasal secretions for changes in amount and color  - [place] appropriate cooling/warming therapies per order  - Administer medications as ordered  - Instruct and encourage patient and family to use good hand hygiene technique  - Identify and instruct in appropriate isolation precautions for identified infection/condition  1/9/2025 0029 by Cornelia Barron RN  Outcome: Progressing  1/9/2025 0029 by Cornelia Barron RN  Outcome: Progressing  Goal: Absence of fever/infection during neutropenic period  Description: INTERVENTIONS:  - Monitor WBC    1/9/2025 0029 by Cornelia Barron RN  Outcome: Progressing  1/9/2025 0029 by Cornelia Barron RN  Outcome: Progressing     Problem: SAFETY ADULT  Goal: Patient will remain free of falls  Description: INTERVENTIONS:  - Educate patient/family on patient safety including physical limitations  - Instruct patient to call for assistance with activity   - Consult OT/PT to assist with strengthening/mobility    - Keep Call bell within reach  - Keep bed low and locked with side rails adjusted as appropriate  - Keep care items and personal belongings within reach  - Initiate and maintain comfort rounds  - Make Fall Risk Sign visible to staff  - Apply yellow socks and bracelet for high fall risk patients  - Consider moving patient to room near nurses station  1/9/2025 0029 by Cornelia Barron RN  Outcome: Progressing  1/9/2025 0029 by Cornelia Barron RN  Outcome: Progressing  Goal: Maintain or return to baseline ADL function  Description: INTERVENTIONS:  -  Assess patient's ability to carry out ADLs; assess patient's baseline for ADL function and identify physical deficits which impact ability to perform ADLs (bathing, care of mouth/teeth, toileting, grooming, dressing, etc.)  - Assess/evaluate cause of self-care deficits   - Assess range of motion  - Assess patient's mobility; develop plan if impaired  - Assess patient's need for assistive devices and provide as appropriate  - Encourage maximum independence but intervene and supervise when necessary  - Involve family in performance of ADLs  - Assess for home care needs following discharge   - Consider OT consult to assist with ADL evaluation and planning for discharge  - Provide patient education as appropriate  1/9/2025 0029 by Cornelia Barron RN  Outcome: Progressing  1/9/2025 0029 by Cornelia Barron RN  Outcome: Progressing  Goal: Maintains/Returns to pre admission functional level  Description: INTERVENTIONS:  - Perform AM-PAC 6 Click Basic Mobility/ Daily Activity assessment daily.  - Set and communicate daily mobility goal to care team and patient/family/caregiver.   - Out of bed for toileting  - Record patient progress and toleration of activity level   1/9/2025 0029 by Cornelia Barron RN  Outcome: Progressing  1/9/2025 0029 by Cornelia Barron RN  Outcome: Progressing     Problem: Knowledge Deficit  Goal: Patient/family/caregiver demonstrates understanding of disease process,  treatment plan, medications, and discharge instructions  Description: Complete learning assessment and assess knowledge base.  Interventions:  - Provide teaching at level of understanding  - Provide teaching via preferred learning methods  2025 by Cornelia Barron RN  Outcome: Progressing  2025 by Cornelia Barron RN  Outcome: Progressing     Problem: DISCHARGE PLANNING  Goal: Discharge to home or other facility with appropriate resources  Description: INTERVENTIONS:  - Identify barriers to discharge w/patient and caregiver  - Arrange for needed discharge resources and transportation as appropriate  - Identify discharge learning needs (meds, wound care, etc.)  - Arrange for interpretive services to assist at discharge as needed  - Refer to Case Management Department for coordinating discharge planning if the patient needs post-hospital services based on physician/advanced practitioner order or complex needs related to functional status, cognitive ability, or social support system  2025 by Cornelia Barron RN  Outcome: Progressing  2025 by Cornelia Barron RN  Outcome: Progressing     Problem: POSTPARTUM  Goal: Experiences normal postpartum course  Description: INTERVENTIONS:  - Monitor maternal vital signs  - Assess uterine involution and lochia  2025 by Cornelia Barron RN  Outcome: Progressing  2025 by Cornelia Barron RN  Outcome: Progressing  Goal: Appropriate maternal -  bonding  Description: INTERVENTIONS:  - Identify family support  - Assess for appropriate maternal/infant bonding   -Encourage maternal/infant bonding opportunities  - Referral to  or  as needed  2025 by Cornelia Barron RN  Outcome: Progressing  2025 by Cornelia Barron RN  Outcome: Progressing  Goal: Establishment of infant feeding pattern  Description: INTERVENTIONS:  - Assess breast/bottle feeding  - Refer to lactation as needed  2025 by Cornelia Barron  RN  Outcome: Progressing  1/9/2025 0029 by Cornelia Barron RN  Outcome: Progressing  Goal: Incision(s), wounds(s) or drain site(s) healing without S/S of infection  Description: INTERVENTIONS  - Assess and document dressing, incision, wound bed, drain sites and surrounding tissue  - Provide patient and family education  1/9/2025 0029 by Cornelia Barron RN  Outcome: Progressing  1/9/2025 0029 by Cornelia Barron RN  Outcome: Progressing

## 2025-01-10 VITALS
HEIGHT: 63 IN | SYSTOLIC BLOOD PRESSURE: 119 MMHG | OXYGEN SATURATION: 98 % | HEART RATE: 86 BPM | BODY MASS INDEX: 26.4 KG/M2 | RESPIRATION RATE: 18 BRPM | WEIGHT: 149 LBS | DIASTOLIC BLOOD PRESSURE: 72 MMHG | TEMPERATURE: 98.1 F

## 2025-01-10 LAB
DME PARACHUTE DELIVERY DATE ACTUAL: NORMAL
DME PARACHUTE DELIVERY DATE REQUESTED: NORMAL
DME PARACHUTE ITEM DESCRIPTION: NORMAL
DME PARACHUTE ORDER STATUS: NORMAL
DME PARACHUTE SUPPLIER NAME: NORMAL
DME PARACHUTE SUPPLIER PHONE: NORMAL

## 2025-01-10 RX ORDER — IBUPROFEN 600 MG/1
600 TABLET, FILM COATED ORAL EVERY 6 HOURS
Qty: 90 TABLET | Refills: 0 | Status: SHIPPED | OUTPATIENT
Start: 2025-01-10 | End: 2025-01-17

## 2025-01-10 RX ORDER — OXYCODONE HYDROCHLORIDE 5 MG/1
5 TABLET ORAL EVERY 8 HOURS PRN
Qty: 5 TABLET | Refills: 0 | Status: SHIPPED | OUTPATIENT
Start: 2025-01-10 | End: 2025-01-17

## 2025-01-10 RX ORDER — DOCUSATE SODIUM 100 MG/1
100 CAPSULE, LIQUID FILLED ORAL 2 TIMES DAILY
Qty: 60 CAPSULE | Refills: 0 | Status: SHIPPED | OUTPATIENT
Start: 2025-01-10 | End: 2025-01-17

## 2025-01-10 RX ORDER — BENZOCAINE/MENTHOL 6 MG-10 MG
1 LOZENGE MUCOUS MEMBRANE 4 TIMES DAILY PRN
Qty: 25 G | Refills: 0 | Status: SHIPPED | OUTPATIENT
Start: 2025-01-10 | End: 2025-01-17

## 2025-01-10 RX ADMIN — DOCUSATE SODIUM 100 MG: 100 CAPSULE, LIQUID FILLED ORAL at 07:31

## 2025-01-10 RX ADMIN — IBUPROFEN 600 MG: 600 TABLET, FILM COATED ORAL at 00:22

## 2025-01-10 RX ADMIN — IBUPROFEN 600 MG: 600 TABLET, FILM COATED ORAL at 06:22

## 2025-01-10 RX ADMIN — ACETAMINOPHEN 650 MG: 325 TABLET, FILM COATED ORAL at 07:31

## 2025-01-10 NOTE — ASSESSMENT & PLAN NOTE
BEHAVIORAL HEALTH DISCHARGE INSTRUCTIONS:  If you start to feel like you cannot keep yourself or others safe:  FOR AN EMERGENCY CALL 911  Go to the nearest Emergency Department  Call [facility] Intake Department: [phone] option 1  Call the Suicide Prevention Lifeline: 656.308.5106  Call the COPE 24/7 Hotline: 213.468.9809  Call the Warmline: 825.241.3297 Hours: 7PM-11PM, Not open on Tuesdays   Avoid weapons or other means of harm.    Refrain from alcohol and drug use.  Continue to see your outpatient providers for all mental health and medical needs.    OUTPATIENT APPOINTMENTS    [facility]     [address] [place] [address] [phone]    [facility]     [address] [place] [address], [address] (and)     [address] [facility] [place], [address]     [place] [address] [phone]     (T18/T19 and many commercial insurances) Therapy Only          [facility] only     [address], [place] [id number] [phone]       Alcohol +  Drug Abuse  Alcoholics Anonymous: [phone]  Alcoholics Anonymous Family Groups: [phone]    Remote Recovery Resources  [email or web address]-[email or web address]  https://aachats.org/zj-sphmrjpq-pepinp  [email or web address]   Baby O+, Rhogam given

## 2025-01-10 NOTE — ASSESSMENT & PLAN NOTE
- routine postpartum care  - baby girl Apgars 9, 9 weighing 3890g, breastfeeding  - Hgb 10.6 > 10.4  - ok for discharge home today  - Discussed reasons to call office after discharge, including but not limited to fever, shortness of breath, chest pain, severe headache, visual change, abdominal pain, heavy vaginal bleeding, persistent sadness or depression. Patient verbalized understanding.

## 2025-01-10 NOTE — DISCHARGE SUMMARY
ADMISSION  Patient of: [facility] [place]  Admission Date: 2025   Admitting Attending: Dr. Madison Carranza DO   Admitting Diagnoses:   Patient Active Problem List   Diagnosis    SAB (spontaneous )    Multigravida of advanced maternal age in third trimester    Maternal care due to low transverse uterine scar from previous  delivery    Rh negative state in antepartum period    Bacteriuria during pregnancy    Request for sterilization    38 weeks gestation of pregnancy    Need for rhogam due to Rh negative mother    Adhesive capsulitis of left shoulder    Pain in left shoulder    39 weeks gestation of pregnancy    Delivery of pregnancy by  section       DELIVERY  Delivery Method: , Low Transverse   Delivery Date and Time: 2025 11:32 AM  Delivery Attending: Madison Carranza     DISCHARGE  Discharge Date: 1/10/25  Discharge Attending: Dr. Jaquelin Kong MD  Discharge Diagnosis:   Same, Delivered     Clinical course: Admission to Delivery  Naye Duran is a 39 y.o.  who was admitted at 39w2d for scheduled repeat LTCS with bilateral salpingectomy.She delivered a healthy baby girl Apgars 9, 9 weighing 3240g.       Delivery  Route of Delivery: , Low Transverse   Reason for  delivery:    scheduled repeat    Anesthesia:  ,   QBL:        QBL:        Delivery: , Low Transverse at 2025 11:32 AM       Baby's Weight: 3890 g (8 lb 9.2 oz); 137.21    Apgar scores: 9  and 9  at 1 and 5 minutes, respectively    Clinical Course: Post-Delivery:  The post delivery course was unremarkable.    On the day of discharge, the patient was ambulating, voiding spontaneously, tolerating oral intake, and hemodynamically stable. She was able to reasonably perform all ADLs. She had appropriate bowel function. Pain was well-controlled. She was discharged home on postpartum/postop day #2 without complications . Patient was instructed to follow up with her OB as an  outpatient and was given appropriate warnings to call her provider with problems or concerns.    Pertinent lab findings included:   Blood type O-.     Last three Hgb values:  Lab Results   Component Value Date    HGB 10.4 (L) 2025    HGB 10.6 (L) 2025    HGB 11.0 (L) 10/22/2024        Problem-specific follow-up plans included the following:  Assessment & Plan  39 weeks gestation of pregnancy    Multigravida of advanced maternal age in third trimester    Maternal care due to low transverse uterine scar from previous  delivery    Rh negative state in antepartum period  Baby O+, Rhogam given  Request for sterilization  S/p bilateral salpingectomy  Delivery of pregnancy by  section  Doing well  Continue postop/postpartum care    Discharge med list:  Contraception:  bilateral salpingectomy     Medication List      START taking these medications     benzocaine-menthol-lanolin-aloe 20-0.5 % topical spray; Commonly known   as: DERMOPLAST; Apply 1 Application topically 3 (three) times a day as   needed for mild pain   docusate sodium 100 mg capsule; Commonly known as: COLACE; Take 1   capsule (100 mg total) by mouth 2 (two) times a day   hydrocortisone 1 % cream; Apply 1 Application topically 4 (four) times a   day as needed for irritation   ibuprofen 600 mg tablet; Commonly known as: MOTRIN; Take 1 tablet (600   mg total) by mouth every 6 (six) hours   oxyCODONE 5 immediate release tablet; Commonly known as: Roxicodone;   Take 1 tablet (5 mg total) by mouth every 8 (eight) hours as needed for   moderate pain for up to 10 days Max Daily Amount: 15 mg   witch hazel-glycerin topical pad; Commonly known as: TUCKS; Apply 1 Pad   topically every 4 (four) hours as needed for irritation     CONTINUE taking these medications     b complex vitamins capsule   Desiccated Beef Liver Powd   magnesium citrate solution   vitamin C 1000 MG tablet   VITAMIN K2-VITAMIN D3 PO     STOP taking these medications      hydrocortisone 25 mg suppository; Commonly known as: ANUSOL-HC       Condition at discharge:   good     Disposition:   Home    Planned Readmission:   No    Discharge Statement:  I have spent a total time of 30 minutes in caring for this patient on the day of the visit/encounter. >30 minutes of time was spent on: Impressions, Counseling / Coordination of care, Documenting in the medical record, Reviewing / ordering tests, medicine, procedures  , and Communicating with other healthcare professionals .

## 2025-01-10 NOTE — PROGRESS NOTES
Progress Note - OB/GYN  Post-Partum Physician Note   Naye Duran 39 y.o. female MRN: [id number]  Unit/Bed#:   Encounter: [id number]  Assessment:  39 y.o. year-old , post-op/postpartum day #2 status-post repeat LTCS plus bilateral salpingectomy    Plan:  Assessment & Plan  Delivery of pregnancy by  section  - routine postpartum care  - baby girl Apgars 9, 9 weighing 3890g, breastfeeding  - Hgb 10.6 > 10.4  - ok for discharge home today  - Discussed reasons to call office after discharge, including but not limited to fever, shortness of breath, chest pain, severe headache, visual change, abdominal pain, heavy vaginal bleeding, persistent sadness or depression. Patient verbalized understanding.  Rh negative state in antepartum period  Baby O+, Rhogam given  Request for sterilization  S/p bilateral salpingectomy    ______________________________________________  Patient is postop and postpartum day #2 following rLTCS + BS     Subjective:   No acute events overnight. Denies HA, visual changes, epigastric pain. Denies shortness of breath or chest pain. Ambulating and voiding without difficulty.  Good urine output. Minimal lochia.      Objective:   Vitals:    25 1528 25 2025 25 2333 01/10/25 0323   BP: 116/67 121/75 126/74 122/59   BP Location: Right arm Left arm Right arm Left arm   Pulse: 88 101 91 92   Resp: 17 18 18 18   Temp: 97.9 °F (36.6 °C) 98.1 °F (36.7 °C) 98.2 °F (36.8 °C) 97.8 °F (36.6 °C)   TempSrc: Temporal Oral Oral Oral   SpO2: 98% 98%  98%   Weight:       Height:         No intake or output data in the 24 hours ending 01/10/25 0754    Physical Exam:  General: AOx3, NAD  Lungs: CTAB  CV: RRR  Abdomen: soft, fundus firm below umbilicus, appropriately tender; incision c/d/I under steri strips with exception of 5 mm spot of blood noted; no actual oozing or eryhema  Extremities: nontender without edema bilaterally      Labs/Tests:   Lab Results   Component Value  "Date/Time    HGB 10.4 (L) 2025 06:01 AM    HGB 10.6 (L) 2025 07:39 AM     2025 06:01 AM     2025 07:39 AM    WBC 19.47 (H) 2025 06:01 AM    WBC 8.96 2025 07:39 AM        Brief OB Lab review:  ABO Grouping   Date Value Ref Range Status   2025 O  Final      Rh Factor   Date Value Ref Range Status   2025 Negative  Final     Rh Type   Date Value Ref Range Status   10/22/2024 Negative  Final     Comment:     Please note: Prior records for this patient's ABO / Rh type are not  available for additional verification.      No results found for: \"ANTIBODYSCR\"  No results found for: \"RUBM\"    MEDS:     Current Facility-Administered Medications:     acetaminophen (TYLENOL) tablet 650 mg, Q4H PRN    benzocaine-menthol-lanolin-aloe (DERMOPLAST) 20-0.5 % topical spray 1 Application, Q6H PRN    calcium carbonate (TUMS) chewable tablet 1,000 mg, Daily PRN    diphenhydrAMINE (BENADRYL) injection 25 mg, Q6H PRN    docusate sodium (COLACE) capsule 100 mg, BID    hydrocortisone 1 % cream 1 Application, Daily PRN    [] ketorolac (TORADOL) injection 30 mg, Q6H LETICIA **FOLLOWED BY** ibuprofen (MOTRIN) tablet 600 mg, Q6H    lactated ringers infusion, Continuous, Last Rate: Stopped (25 2100)    oxyCODONE (ROXICODONE) IR tablet 10 mg, Q4H PRN    oxyCODONE (ROXICODONE) IR tablet 5 mg, Q4H PRN    witch hazel-glycerin (TUCKS) topical pad 1 Pad, Q4H PRN  Invasive Devices       None                     Jaquelin Kong MD  1/10/2025 7:54 AM               "

## 2025-01-10 NOTE — ANESTHESIA POSTPROCEDURE EVALUATION
Post-Op Assessment Note    CV Status:  Stable    Pain management: adequate       Mental Status:  Alert and awake   Hydration Status:  Euvolemic   PONV Controlled:  Controlled   Airway Patency:  Patent     Post Op Vitals Reviewed: Yes    No anethesia notable event occurred.    Staff: Anesthesiologist           Last Filed PACU Vitals:  Vitals Value Taken Time   Temp 97.8 °F (36.6 °C) 01/08/25 1405   Pulse 89 01/08/25 1405   /63 01/08/25 1405   Resp 18 01/08/25 1405   SpO2 97 % 01/08/25 1405       Modified Quintin:     Vitals Value Taken Time   Activity 2 01/08/25 1305   Respiration 2 01/08/25 1305   Circulation 2 01/08/25 1305   Consciousness 2 01/08/25 1305   Oxygen Saturation 2 01/08/25 1305     Modified Quintin Score: 10

## 2025-01-10 NOTE — PLAN OF CARE
Problem: PAIN - ADULT  Goal: Verbalizes/displays adequate comfort level or baseline comfort level  Description: Interventions:  - Encourage patient to monitor pain and request assistance  - Assess pain using appropriate pain scale  - Administer analgesics based on type and severity of pain and evaluate response  - Implement non-pharmacological measures as appropriate and evaluate response  - Consider cultural and social influences on pain and pain management  - Notify physician/advanced practitioner if interventions unsuccessful or patient reports new pain  Outcome: Progressing     Problem: INFECTION - ADULT  Goal: Absence or prevention of progression during hospitalization  Description: INTERVENTIONS:  - Assess and monitor for signs and symptoms of infection  - Monitor lab/diagnostic results  - Monitor all insertion sites, i.e. indwelling lines, tubes, and drains  - Monitor endotracheal if appropriate and nasal secretions for changes in amount and color  - [place] appropriate cooling/warming therapies per order  - Administer medications as ordered  - Instruct and encourage patient and family to use good hand hygiene technique  - Identify and instruct in appropriate isolation precautions for identified infection/condition  Outcome: Progressing  Goal: Absence of fever/infection during neutropenic period  Description: INTERVENTIONS:  - Monitor WBC    Outcome: Progressing     Problem: SAFETY ADULT  Goal: Patient will remain free of falls  Description: INTERVENTIONS:  - Educate patient/family on patient safety including physical limitations  - Instruct patient to call for assistance with activity   - Consult OT/PT to assist with strengthening/mobility   - Keep Call bell within reach  - Keep bed low and locked with side rails adjusted as appropriate  - Keep care items and personal belongings within reach  - Initiate and maintain comfort rounds  - Apply yellow socks and bracelet for high fall risk patients  - Consider  moving patient to room near nurses station  Outcome: Progressing  Goal: Maintain or return to baseline ADL function  Description: INTERVENTIONS:  -  Assess patient's ability to carry out ADLs; assess patient's baseline for ADL function and identify physical deficits which impact ability to perform ADLs (bathing, care of mouth/teeth, toileting, grooming, dressing, etc.)  - Assess/evaluate cause of self-care deficits   - Assess range of motion  - Assess patient's mobility; develop plan if impaired  - Assess patient's need for assistive devices and provide as appropriate  - Encourage maximum independence but intervene and supervise when necessary  - Involve family in performance of ADLs  - Assess for home care needs following discharge   - Consider OT consult to assist with ADL evaluation and planning for discharge  - Provide patient education as appropriate  Outcome: Progressing  Goal: Maintains/Returns to pre admission functional level  Description: INTERVENTIONS:  - Perform AM-PAC 6 Click Basic Mobility/ Daily Activity assessment daily.  - Set and communicate daily mobility goal to care team and patient/family/caregiver.   - Collaborate with rehabilitation services on mobility goals if consulted  - Out of bed for toileting  - Record patient progress and toleration of activity level   Outcome: Progressing     Problem: Knowledge Deficit  Goal: Patient/family/caregiver demonstrates understanding of disease process, treatment plan, medications, and discharge instructions  Description: Complete learning assessment and assess knowledge base.  Interventions:  - Provide teaching at level of understanding  - Provide teaching via preferred learning methods  Outcome: Progressing     Problem: DISCHARGE PLANNING  Goal: Discharge to home or other facility with appropriate resources  Description: INTERVENTIONS:  - Identify barriers to discharge w/patient and caregiver  - Arrange for needed discharge resources and transportation as  appropriate  - Identify discharge learning needs (meds, wound care, etc.)  - Arrange for interpretive services to assist at discharge as needed  - Refer to Case Management Department for coordinating discharge planning if the patient needs post-hospital services based on physician/advanced practitioner order or complex needs related to functional status, cognitive ability, or social support system  Outcome: Progressing     Problem: POSTPARTUM  Goal: Experiences normal postpartum course  Description: INTERVENTIONS:  - Monitor maternal vital signs  - Assess uterine involution and lochia  Outcome: Progressing  Goal: Appropriate maternal -  bonding  Description: INTERVENTIONS:  - Identify family support  - Assess for appropriate maternal/infant bonding   -Encourage maternal/infant bonding opportunities  - Referral to  or  as needed  Outcome: Progressing  Goal: Establishment of infant feeding pattern  Description: INTERVENTIONS:  - Assess breast/bottle feeding  - Refer to lactation as needed  Outcome: Progressing  Goal: Incision(s), wounds(s) or drain site(s) healing without S/S of infection  Description: INTERVENTIONS  - Assess and document dressing, incision, wound bed, drain sites and surrounding tissue  - Provide patient and family education    Outcome: Progressing

## 2025-01-13 PROCEDURE — 88302 TISSUE EXAM BY PATHOLOGIST: CPT | Performed by: STUDENT IN AN ORGANIZED HEALTH CARE EDUCATION/TRAINING PROGRAM

## 2025-01-14 ENCOUNTER — TELEPHONE (OUTPATIENT)
Dept: OBGYN CLINIC | Facility: CLINIC | Age: 40
End: 2025-01-14

## 2025-01-14 NOTE — TELEPHONE ENCOUNTER
POSTPARTUM PHONE CALL ASSESSMENT    Date of Delivery: 1/8/2025  Delivering Provider: Dr Madison Carranza  Mode: repeat C/S  Delivery Notes/Complications:     Do you still have bleeding/pain? If so, how much/how severe?   - no vaginal bleeding @ present, sl with wiping    - taking prenatal vits  - patient had Rhogam postpartum    Regular BMs/Urination?   - normal bowel/bladder habits    Breastfeeding/Formula/Both?   - was breastfeeding q 2-3 hrs, now 1.5 hrs loss with some weight    How are you doing emotionally?   - patient states she is doing well     Do you have any other questions or concerns for us or your provider? no    Have you scheduled the pediatrician appointment with pediatrician?   - [facility] - appointment 1/13/2025 - baby had 1 lb weight loss (patient felt her milk supply had not increased until 1/13/2025) - baby has weight recheck on 1/16/2025 ([place])    Do you have a postpartum visit scheduled? yes  Date scheduled:  1/16/2025 inc check   Dr Carranza

## 2025-01-16 LAB — PLACENTA IN STORAGE: NORMAL

## 2025-01-16 NOTE — PROGRESS NOTES
Routine Post Partum Visit  [facility] [place]  [address], [address], [place] [address]    Assessment/Plan:  Naye is a 39 y.o. year old  who presents for postpartum visit.    Routine Postpartum Care  Normal postpartum exam  Contraception:  bilateral salpingectomy  Depression Screen: PPD screen score: 0; negative  Feeding:  She is breast feeding exclusively.  Follow up in: 2-4 weeks for further postpartum caret, or as needed.    Additional Problems:  Assessment & Plan  Postpartum care and examination  1 week s/p repeat LTCS and bilateral salpingectomy.  Doing well.  Continue restrictions.         Next visit: 2-4 weeks postpartum    Subjective:     CC: Postpartum visit    Naye Duran is a 39 y.o. y.o. female  who presents for a postpartum visit.     She is 1 week postpartum following a low cervical transverse  section on 2025 at 39 weeks.    Outcome: repeat  section, low transverse incision,   Anesthesia: spinal. Postpartum course has been uncomplicated. Baby's course has been uncomplicated. Baby is feeding by She is breast feeding exclusively..     Bleeding thin lochia. Bowel function is normal. Bladder function is normal. Patient is not sexually active since delivery. Contraception method is  bilateral salpingectomy . Postpartum depression screening: negative.    The following portions of the patient's history were reviewed and updated as appropriate: allergies, current medications, past family history, past medical history, obstetric history, gynecologic history, past social history, past surgical history and problem list.      Objective:  LMP 2024 (Exact Date)   Pregravid Weight/BMI: 55.8 kg (123 lb) (BMI 21.79)  Current Weight:     Total Weight Gain: 11.8 kg (26 lb)     General: Well appearing, no distress.  Mood and affect: Appropriate.  Abdomen: Soft, nontender  Incision: healing well, mild ecchymosis  Pelvic: deferred

## 2025-01-17 ENCOUNTER — POSTPARTUM VISIT (OUTPATIENT)
Dept: OBGYN CLINIC | Facility: CLINIC | Age: 40
End: 2025-01-17

## 2025-01-17 VITALS
SYSTOLIC BLOOD PRESSURE: 118 MMHG | DIASTOLIC BLOOD PRESSURE: 64 MMHG | HEIGHT: 62 IN | BODY MASS INDEX: 24.03 KG/M2 | WEIGHT: 130.6 LBS

## 2025-01-17 PROCEDURE — 99024 POSTOP FOLLOW-UP VISIT: CPT | Performed by: OBSTETRICS & GYNECOLOGY

## 2025-01-17 NOTE — PATIENT INSTRUCTIONS
Postpartum   - bleeding may continue postpartum for up to 6-8 weeks, but overall should be decreasing.  Sometimes, even if breast feeding, your period may start about 6 weeks after delivery, some may take longer to return   - increase activity slowly as you feel ready.  No vigorous physical exercise, running/jogging, or aerobic activity until full 6 weeks postpartum.  As increasing activity - start slowly and listen to your body.     - no lifting more than the baby in car seat until 6 full weeks postpartum, especially if you had a  or vaginal stitches, unless instructed differently by your doctor.   - okay to resume intercourse after 6 full weeks following delivery, be sure to use contraception.  It is possible to get pregnant before your first period.     - for information on birth control please see website www.[employer]sider.org - for all things birth control   - if breast feeding/nursing, periods may not return right away.  They may return after some time or not until you completely stop nursing.  Everyone is different.    For additional information on postpartum period see the following resources below:   - [facility] [place], ph: [phone], [email or web address]-me  Lactation and mental health support   - Fourth Trimester Project - www.[employer].crossvertise  Resource for all things postpartum   - National Suicide and Crisis Lifeline - 988  If you or someone you know is experiencing emotional distress, crisis, or having suicidal thoughts, please contact the Suicide and Crisis Lifeline  Text or call 988 from landline or cell phone for  support  Visit [facility] to chat, button in the top right-hand corner of the screen

## 2025-02-05 ENCOUNTER — POSTPARTUM VISIT (OUTPATIENT)
Dept: OBGYN CLINIC | Facility: CLINIC | Age: 40
End: 2025-02-05

## 2025-02-05 VITALS
HEIGHT: 62 IN | BODY MASS INDEX: 23.19 KG/M2 | SYSTOLIC BLOOD PRESSURE: 108 MMHG | DIASTOLIC BLOOD PRESSURE: 58 MMHG | WEIGHT: 126 LBS

## 2025-02-05 DIAGNOSIS — Z86.69 HISTORY OF CLUSTER HEADACHE: ICD-10-CM

## 2025-02-05 DIAGNOSIS — Z09 POSTOP CHECK: Primary | ICD-10-CM

## 2025-02-05 PROCEDURE — 99024 POSTOP FOLLOW-UP VISIT: CPT | Performed by: OBSTETRICS & GYNECOLOGY

## 2025-02-05 NOTE — PROGRESS NOTES
"Routine Post Partum Visit  [facility] [place]  [address], [address], [place] [address]    Assessment/Plan:  Naye is a 39 y.o. year old  who presents for postpartum visit.    Routine Postpartum Care  Normal postpartum exam  Contraception: tubal ligation  Depression Screen: PPD screen score: 0; negative  Feeding:  She is breast feeding exclusively.  Physical therapy referral: no  Cervical cancer screening Up to Date  Follow up in: 3 months for wellness visit, or as needed.    Additional Problems:  Assessment & Plan  Postop check       -  informed patient of unremarkable findings on exam       Postpartum exam         History of cluster headache    -  pt with h/o cluster HA since age 9, HA resolve spontaneously           Next visit: 3 mos [place]    Subjective:     CC: Postpartum visit    Naye Duran is a 39 y.o. y.o. female  who presents for a postpartum visit.     She is 4 weeks postpartum following a low transverse  section on 25 at 39.2 weeks.    Outcome: repeat  section, low transverse incision, n/a  Anesthesia: spinal. Postpartum course has been uncomplicated. Baby's course has been uncomplicated. Baby is feeding by She is breast feeding exclusively..     Bleeding thin lochia. Bowel function is normal. Bladder function is normal. Patient is not sexually active since delivery. Contraception method is tubal ligation. Postpartum depression screening: negative.    The following portions of the patient's history were reviewed and updated as appropriate: allergies, current medications, past family history, past medical history, obstetric history, gynecologic history, past social history, past surgical history and problem list.      Objective:  /58 (BP Location: Left arm, Patient Position: Sitting, Cuff Size: Standard)   Ht 5' 2\" (1.575 m)   Wt 57.2 kg (126 lb)   LMP 2024 (Exact Date)   Breastfeeding Yes   BMI 23.05 kg/m²   Pregravid " Weight/BMI: 55.8 kg (123 lb) (BMI 21.79)  Current Weight: 57.2 kg (126 lb)   Total Weight Gain: 11.8 kg (26 lb)     General: Well appearing, no distress.  Mood and affect: Appropriate.  Abdomen: Soft, nontender  Incision: c/d/i  Vulva: normal  Vagina: normal  Cervix: closed, no bleeding  Uterus: non-tender  Adnexa: no masses, no tenderness

## (undated) DEVICE — GLOVE PI ULTRA TOUCH SZ.6.5

## (undated) DEVICE — SKIN MARKER DUAL TIP WITH RULER CAP, FLEXIBLE RULER AND LABELS: Brand: DEVON

## (undated) DEVICE — SUT VICRYL 0 CT-1 27 IN J260H

## (undated) DEVICE — DECANTER: Brand: UNBRANDED

## (undated) DEVICE — ABDOMINAL PAD: Brand: DERMACEA

## (undated) DEVICE — 3M™ STERI-STRIP™ COMPOUND BENZOIN TINCTURE 40 BAGS/CARTON 4 CARTONS/CASE C1544: Brand: 3M™ STERI-STRIP™

## (undated) DEVICE — PACK C-SECTION PBDS

## (undated) DEVICE — 3M™ STERI-STRIP™ REINFORCED ADHESIVE SKIN CLOSURES, R1547, 1/2 IN X 4 IN (12 MM X 100 MM), 6 STRIPS/ENVELOPE: Brand: 3M™ STERI-STRIP™

## (undated) DEVICE — SUT PLAIN 2-0 CTX 27 IN 872H

## (undated) DEVICE — STAPLER INSORB SUBCUTICULAR 30 SINGLE USE

## (undated) DEVICE — Device

## (undated) DEVICE — TELFA NON-ADHERENT ABSORBENT DRESSING: Brand: TELFA

## (undated) DEVICE — CHLORAPREP HI-LITE 26ML ORANGE